# Patient Record
Sex: FEMALE | Race: WHITE | NOT HISPANIC OR LATINO | Employment: STUDENT | URBAN - METROPOLITAN AREA
[De-identification: names, ages, dates, MRNs, and addresses within clinical notes are randomized per-mention and may not be internally consistent; named-entity substitution may affect disease eponyms.]

---

## 2023-01-18 PROBLEM — K52.9 COLITIS: Status: ACTIVE | Noted: 2023-01-18

## 2023-01-18 PROBLEM — U07.1 COVID-19 VIRUS INFECTION: Status: ACTIVE | Noted: 2023-01-18

## 2023-01-18 PROBLEM — F98.8 ADD (ATTENTION DEFICIT DISORDER): Status: ACTIVE | Noted: 2023-01-18

## 2023-01-18 PROBLEM — F51.04 CHRONIC INSOMNIA: Status: ACTIVE | Noted: 2023-01-18

## 2023-01-18 PROBLEM — F32.A ANXIETY AND DEPRESSION: Status: ACTIVE | Noted: 2023-01-18

## 2023-01-18 PROBLEM — M54.2 CERVICAL MUSCLE PAIN: Status: ACTIVE | Noted: 2023-01-18

## 2023-01-18 PROBLEM — E55.9 VITAMIN D DEFICIENCY: Status: ACTIVE | Noted: 2023-01-18

## 2023-01-18 PROBLEM — R79.89 ABNORMAL CBC: Status: ACTIVE | Noted: 2023-01-18

## 2023-01-18 PROBLEM — F60.5 OBSESSIVE COMPULSIVE PERSONALITY DISORDER (MULTI): Status: ACTIVE | Noted: 2023-01-18

## 2023-01-18 PROBLEM — M26.69: Status: ACTIVE | Noted: 2023-01-18

## 2023-01-18 PROBLEM — F41.9 ANXIETY AND DEPRESSION: Status: ACTIVE | Noted: 2023-01-18

## 2023-01-18 PROBLEM — J45.40: Status: ACTIVE | Noted: 2023-01-18

## 2023-01-18 PROBLEM — J35.1 ENLARGED TONSILS: Status: ACTIVE | Noted: 2023-01-18

## 2023-02-15 PROBLEM — Z86.59 HISTORY OF POSTTRAUMATIC STRESS DISORDER (PTSD): Status: ACTIVE | Noted: 2023-02-15

## 2023-02-15 PROBLEM — Z86.59 H/O EATING DISORDER: Status: ACTIVE | Noted: 2023-02-15

## 2023-02-15 PROBLEM — Z97.5 NEXPLANON IN PLACE: Status: ACTIVE | Noted: 2023-02-15

## 2023-03-05 PROBLEM — Z00.00 ROUTINE HEALTH MAINTENANCE: Status: ACTIVE | Noted: 2023-03-05

## 2023-03-05 NOTE — PROGRESS NOTES
Subjective   Patient ID: Aisha Mares is a 23 y.o. female who presents for routine follow on health.    On 11/30/22 we discussed...  Quit smoking so using albuterol less  Effexor for anxiety and depression  Chiro, PT, and flexeril for neck pain  Nutritionist for eating d/o  Hydroxyzine for sleeping d/o  Was to FU with GI for colitis (c-scope and egd)        Review of Systems    Objective   Physical Exam    There were no vitals taken for this visit.    Assessment/Plan   Problem List Items Addressed This Visit    None

## 2023-03-06 ENCOUNTER — APPOINTMENT (OUTPATIENT)
Dept: PRIMARY CARE | Facility: CLINIC | Age: 23
End: 2023-03-06
Payer: COMMERCIAL

## 2023-03-07 ENCOUNTER — APPOINTMENT (OUTPATIENT)
Dept: PRIMARY CARE | Facility: CLINIC | Age: 23
End: 2023-03-07
Payer: COMMERCIAL

## 2023-03-10 ENCOUNTER — OFFICE VISIT (OUTPATIENT)
Dept: PRIMARY CARE | Facility: CLINIC | Age: 23
End: 2023-03-10
Payer: COMMERCIAL

## 2023-03-10 VITALS
DIASTOLIC BLOOD PRESSURE: 76 MMHG | TEMPERATURE: 97.7 F | SYSTOLIC BLOOD PRESSURE: 105 MMHG | HEART RATE: 92 BPM | OXYGEN SATURATION: 96 % | BODY MASS INDEX: 46.97 KG/M2 | WEIGHT: 248.6 LBS

## 2023-03-10 DIAGNOSIS — Z86.59 H/O EATING DISORDER: ICD-10-CM

## 2023-03-10 DIAGNOSIS — F60.5 OBSESSIVE COMPULSIVE PERSONALITY DISORDER (MULTI): ICD-10-CM

## 2023-03-10 DIAGNOSIS — F98.8 ATTENTION DEFICIT DISORDER, UNSPECIFIED HYPERACTIVITY PRESENCE: ICD-10-CM

## 2023-03-10 DIAGNOSIS — M54.2 CERVICAL MUSCLE PAIN: ICD-10-CM

## 2023-03-10 DIAGNOSIS — F41.9 ANXIETY AND DEPRESSION: ICD-10-CM

## 2023-03-10 DIAGNOSIS — J45.40 ASTHMA IN ADULT, MODERATE PERSISTENT, UNCOMPLICATED (HHS-HCC): ICD-10-CM

## 2023-03-10 DIAGNOSIS — F32.A ANXIETY AND DEPRESSION: ICD-10-CM

## 2023-03-10 DIAGNOSIS — G47.33 MODERATE OBSTRUCTIVE SLEEP APNEA: ICD-10-CM

## 2023-03-10 DIAGNOSIS — L70.0 ACNE VULGARIS: Primary | ICD-10-CM

## 2023-03-10 PROBLEM — Z92.89: Status: ACTIVE | Noted: 2023-03-10

## 2023-03-10 PROBLEM — U07.1 COVID-19 VIRUS INFECTION: Status: RESOLVED | Noted: 2023-01-18 | Resolved: 2023-03-10

## 2023-03-10 PROCEDURE — 1036F TOBACCO NON-USER: CPT | Performed by: NURSE PRACTITIONER

## 2023-03-10 PROCEDURE — 99214 OFFICE O/P EST MOD 30 MIN: CPT | Performed by: NURSE PRACTITIONER

## 2023-03-10 RX ORDER — BENZOYL PEROXIDE 5 G/100G
GEL TOPICAL 2 TIMES DAILY
Qty: 60 G | Refills: 11 | Status: SHIPPED | OUTPATIENT
Start: 2023-03-10 | End: 2023-07-08

## 2023-03-10 RX ORDER — METHOCARBAMOL 500 MG/1
500 TABLET, FILM COATED ORAL 3 TIMES DAILY
COMMUNITY
End: 2023-06-06 | Stop reason: ALTCHOICE

## 2023-03-10 RX ORDER — MELOXICAM 15 MG/1
15 TABLET ORAL DAILY
COMMUNITY
End: 2023-06-19 | Stop reason: ALTCHOICE

## 2023-03-10 RX ORDER — TRETINOIN 0.1 MG/G
GEL TOPICAL NIGHTLY
Qty: 45 G | Refills: 11 | Status: SHIPPED | OUTPATIENT
Start: 2023-03-10 | End: 2024-03-09

## 2023-03-10 ASSESSMENT — PATIENT HEALTH QUESTIONNAIRE - PHQ9
SUM OF ALL RESPONSES TO PHQ9 QUESTIONS 1 AND 2: 3
SUM OF ALL RESPONSES TO PHQ QUESTIONS 1-9: 10
2. FEELING DOWN, DEPRESSED OR HOPELESS: SEVERAL DAYS
5. POOR APPETITE OR OVEREATING: MORE THAN HALF THE DAYS
7. TROUBLE CONCENTRATING ON THINGS, SUCH AS READING THE NEWSPAPER OR WATCHING TELEVISION: SEVERAL DAYS
3. TROUBLE FALLING OR STAYING ASLEEP OR SLEEPING TOO MUCH: MORE THAN HALF THE DAYS
10. IF YOU CHECKED OFF ANY PROBLEMS, HOW DIFFICULT HAVE THESE PROBLEMS MADE IT FOR YOU TO DO YOUR WORK, TAKE CARE OF THINGS AT HOME, OR GET ALONG WITH OTHER PEOPLE: VERY DIFFICULT
8. MOVING OR SPEAKING SO SLOWLY THAT OTHER PEOPLE COULD HAVE NOTICED. OR THE OPPOSITE, BEING SO FIGETY OR RESTLESS THAT YOU HAVE BEEN MOVING AROUND A LOT MORE THAN USUAL: NOT AT ALL
4. FEELING TIRED OR HAVING LITTLE ENERGY: MORE THAN HALF THE DAYS
9. THOUGHTS THAT YOU WOULD BE BETTER OFF DEAD, OR OF HURTING YOURSELF: NOT AT ALL
6. FEELING BAD ABOUT YOURSELF - OR THAT YOU ARE A FAILURE OR HAVE LET YOURSELF OR YOUR FAMILY DOWN: NOT AT ALL
1. LITTLE INTEREST OR PLEASURE IN DOING THINGS: MORE THAN HALF THE DAYS

## 2023-03-10 NOTE — PROGRESS NOTES
Subjective   Patient ID: Aisha Mares is a 23 y.o. female who presents for Follow-up (3-4 month follow up/Mental health medication ).  Sleep apnea - has cpap has not started to use, had to wait for new mask  Plans to start this weekend    Thinks effexor is still working  Had more depressive symptoms recently, but seeing new counselor  1 month until her 1 yr sobriety   Misses college lifestyle    Working out at gym  But more neck and back pain recently  Saw doc at spine clinic  Is going to start PT  Does take robaxin, but has not used recently    Did not see nutritionist  Angelic Stark in [place] is eating disorder counselor    Review of Systems  ROS completely negative except what was mentioned in the HPI.  Problem List, surgical, social, and family histories which were reviewed and updated as necessary within the EMR. I also personally reviewed the notes, labs, and imaging that pertained to what was documented or discussed in the HPI.      Objective   Physical Exam  Vitals and nursing note reviewed.   Constitutional:       General: She is not in acute distress.     Appearance: Normal appearance.   HENT:      Head: Normocephalic and atraumatic.      Right Ear: Tympanic membrane, ear canal and external ear normal.      Left Ear: Tympanic membrane, ear canal and external ear normal.      Nose: Nose normal.      Mouth/Throat:      Mouth: Mucous membranes are moist.   Eyes:      Extraocular Movements: Extraocular movements intact.      Conjunctiva/sclera: Conjunctivae normal.      Pupils: Pupils are equal, round, and reactive to light.   Cardiovascular:      Rate and Rhythm: Normal rate and regular rhythm.      Heart sounds: Normal heart sounds.   Pulmonary:      Effort: Pulmonary effort is normal.      Breath sounds: Normal breath sounds.   Musculoskeletal:         General: Normal range of motion.      Cervical back: Normal range of motion and neck supple.   Lymphadenopathy:      Cervical: No cervical adenopathy.    Skin:     General: Skin is warm and dry.   Neurological:      Mental Status: She is alert and oriented to person, place, and time. Mental status is at baseline.   Psychiatric:         Mood and Affect: Mood normal.         Behavior: Behavior normal.         Thought Content: Thought content normal.         Judgment: Judgment normal.         /76 (BP Location: Right leg)   Pulse 92   Temp 36.5 °C (97.7 °F) (Temporal)   Wt 113 kg (248 lb 9.6 oz)   SpO2 96%   BMI 46.97 kg/m²     Assessment/Plan   Problem List Items Addressed This Visit          Nervous    Moderate obstructive sleep apnea     Has plan to start CPAP this weekend            Respiratory    Asthma in adult, moderate persistent, uncomplicated     Has improved since quitting vaping            Musculoskeletal    Cervical muscle pain     Has upcoming PT  Robaxin prn            Other    ADD (attention deficit disorder)    Anxiety and depression    Obsessive compulsive personality disorder (CMS/HCC)    H/O eating disorder     Eating disorder counselor - Angelic Stark in [place]         Acne vulgaris - Primary     Ordered benzoyl peroxide and retin-a  Derm referral         Relevant Medications    benzoyl peroxide 5 % gel    tretinoin (Retin-A) 0.01 % gel    Other Relevant Orders    Referral to Dermatology

## 2023-05-27 PROBLEM — Z87.820 H/O CONCUSSION: Status: ACTIVE | Noted: 2023-05-27

## 2023-05-27 PROBLEM — E66.813 CLASS 3 SEVERE OBESITY DUE TO EXCESS CALORIES WITH BODY MASS INDEX (BMI) OF 45.0 TO 49.9 IN ADULT: Status: ACTIVE | Noted: 2023-05-27

## 2023-05-27 PROBLEM — E66.01 CLASS 3 SEVERE OBESITY DUE TO EXCESS CALORIES WITH BODY MASS INDEX (BMI) OF 45.0 TO 49.9 IN ADULT (MULTI): Status: ACTIVE | Noted: 2023-05-27

## 2023-05-27 PROBLEM — R79.89 ABNORMAL CBC: Status: RESOLVED | Noted: 2023-01-18 | Resolved: 2023-05-27

## 2023-05-27 PROBLEM — Z79.899 MEDICATION MANAGEMENT: Status: ACTIVE | Noted: 2023-05-27

## 2023-06-05 ENCOUNTER — TELEPHONE (OUTPATIENT)
Dept: PRIMARY CARE | Facility: CLINIC | Age: 23
End: 2023-06-05

## 2023-06-05 ENCOUNTER — APPOINTMENT (OUTPATIENT)
Dept: PRIMARY CARE | Facility: CLINIC | Age: 23
End: 2023-06-05
Payer: COMMERCIAL

## 2023-06-05 NOTE — TELEPHONE ENCOUNTER
Pt called and left message regarding pain in shoulder and neck and now numbness in left hand  Spoke with Pt  Pt stated she has undergone 6 weeks of PT -3 times a week. It has helped her back issue but has not helped her neck or shoulder- actually at times it made it worse  Her left hand has been numb since yesterday  Pt stated that when her head is still her pain is a 2. When her head is tilted to the right it is a 7.  When her head is tilted to the left she states the pain is a 9 and she gets shooting pain down her left side to her hand, side and foot  Please advise

## 2023-06-06 ENCOUNTER — OFFICE VISIT (OUTPATIENT)
Dept: PRIMARY CARE | Facility: CLINIC | Age: 23
End: 2023-06-06
Payer: COMMERCIAL

## 2023-06-06 VITALS
DIASTOLIC BLOOD PRESSURE: 75 MMHG | BODY MASS INDEX: 48 KG/M2 | TEMPERATURE: 98.1 F | HEART RATE: 110 BPM | HEIGHT: 61 IN | WEIGHT: 254.25 LBS | OXYGEN SATURATION: 96 % | SYSTOLIC BLOOD PRESSURE: 108 MMHG

## 2023-06-06 DIAGNOSIS — E55.9 VITAMIN D DEFICIENCY: ICD-10-CM

## 2023-06-06 DIAGNOSIS — M54.12 CERVICAL RADICULOPATHY: ICD-10-CM

## 2023-06-06 DIAGNOSIS — F41.9 ANXIETY AND DEPRESSION: ICD-10-CM

## 2023-06-06 DIAGNOSIS — F32.A ANXIETY AND DEPRESSION: ICD-10-CM

## 2023-06-06 DIAGNOSIS — M54.2 NECK PAIN: Primary | ICD-10-CM

## 2023-06-06 DIAGNOSIS — R73.9 HYPERGLYCEMIA: ICD-10-CM

## 2023-06-06 PROCEDURE — 99214 OFFICE O/P EST MOD 30 MIN: CPT | Performed by: INTERNAL MEDICINE

## 2023-06-06 PROCEDURE — 1036F TOBACCO NON-USER: CPT | Performed by: INTERNAL MEDICINE

## 2023-06-06 RX ORDER — GABAPENTIN 100 MG/1
100 CAPSULE ORAL 3 TIMES DAILY
Qty: 90 CAPSULE | Refills: 5 | Status: SHIPPED | OUTPATIENT
Start: 2023-06-06 | End: 2023-06-19 | Stop reason: SDUPTHER

## 2023-06-06 RX ORDER — METHYLPREDNISOLONE 4 MG/1
TABLET ORAL
Qty: 21 TABLET | Refills: 0 | Status: SHIPPED | OUTPATIENT
Start: 2023-06-06 | End: 2023-06-13

## 2023-06-06 RX ORDER — LAMOTRIGINE 25 MG/1
50 TABLET ORAL DAILY
COMMUNITY
End: 2023-09-26 | Stop reason: DRUGHIGH

## 2023-06-06 ASSESSMENT — PATIENT HEALTH QUESTIONNAIRE - PHQ9
2. FEELING DOWN, DEPRESSED OR HOPELESS: NOT AT ALL
1. LITTLE INTEREST OR PLEASURE IN DOING THINGS: NOT AT ALL
SUM OF ALL RESPONSES TO PHQ9 QUESTIONS 1 AND 2: 0

## 2023-06-06 NOTE — PROGRESS NOTES
Assessment and Plan:  Problem List Items Addressed This Visit    None  Visit Diagnoses       Neck pain    -  Primary    Relevant Medications    methylPREDNISolone (Medrol Dospak) 4 mg tablets    gabapentin (Neurontin) 100 mg capsule    Other Relevant Orders    MR cervical spine wo IV contrast    Referral to Medical Spine    Cervical radiculopathy        XR normal in 11/22  Sx present for 3 years  Failed PT and NSAIDs  Will get MRI  Medrol and Gabepentin for pain    Relevant Medications    methylPREDNISolone (Medrol Dospak) 4 mg tablets    gabapentin (Neurontin) 100 mg capsule    Other Relevant Orders    MR cervical spine wo IV contrast    Referral to Medical Spine            HPI: c/o neck pain  Symptoms started NYD 2020. Woke up and could barely move her neck  Thought she slept wrong but persisted  Worse over time  Hears cracking if turns head to the right  Even just doing chores has to lay down due to the pain  Points to base of neck and border of shoulder, sometimes hairline and sometimes down back  Also had lower back issues and was seeing PT  Went to [facility]  Did PT for 6 weeks- 3 times a week.   It helped her lower back but made pain worse in shoulder and neck.   Even touching the back of the neck on the cervical spine she felt so much pain she thought she would fall over  Then got the shooting pain and numbness down the top and ventral aspect of her arm, she points  Middle finger and thumb mostly  No pain in legs now, did have some pain with her back pain  Sometimes if PT touched places on her neck seh would get pain down her legs  Bad HA past two days  + nausea, maybe due to the pain  HA base of skull  PT said it could be joint related.   Sunday night pain at base of skull; felt like she could collapse; some shooting pain but numbness in left hand has been consistent since Sunday night.   Muscle relaxant helps her sleep  Mobic not helping pain  XR neck 11/22:  Normal radiographs of the cervical spine  XR  also in 2014: mild straightening of the cervical spine which is likely positional     Labs reviewed:  Component      Latest Ref Rng 11/16/2022   WBC      4.4 - 11.3 x10E9/L 7.5    RBC      4.00 - 5.20 x10E12/L 5.62 (H)    HEMOGLOBIN      12.0 - 16.0 g/dL 16.1 (H)    HEMATOCRIT      36.0 - 46.0 % 48.9 (H)    MCV      80 - 100 fL 87    MCHC      32.0 - 36.0 g/dL 32.9    Platelets      150 - 450 x10E9/L 286    RED CELL DISTRIBUTION WIDTH      11.5 - 14.5 % 12.7    Neutrophils %      40.0 - 80.0 % 44.7    Immature Granulocytes %, Automated      0.0 - 0.9 % 0.3    Lymphocytes %      13.0 - 44.0 % 30.7    Monocytes %      2.0 - 10.0 % 8.9    Eosinophils %      0.0 - 6.0 % 14.6    Basophils %      0.0 - 2.0 % 0.8    Neutrophils Absolute      1.20 - 7.70 x10E9/L 3.35    Lymphocytes Absolute      1.20 - 4.80 x10E9/L 2.30    Monocytes Absolute      0.10 - 1.00 x10E9/L 0.67    Eosinophils Absolute      0.00 - 0.70 x10E9/L 1.09 (H)    Basophils Absolute      0.00 - 0.10 x10E9/L 0.06    GLUCOSE      74 - 99 mg/dL 78    SODIUM      136 - 145 mmol/L 139    POTASSIUM      3.5 - 5.3 mmol/L 3.8    CHLORIDE      98 - 107 mmol/L 102    Bicarbonate      21 - 32 mmol/L 27    Anion Gap      10 - 20 mmol/L 14    Blood Urea Nitrogen      6 - 23 mg/dL 12    Creatinine      0.50 - 1.05 mg/dL 1.07 (H)    GFR Female      >90 mL/min/1.73m2 75    Calcium      8.6 - 10.3 mg/dL 9.4    Albumin      3.4 - 5.0 g/dL 4.0    Alkaline Phosphatase      33 - 110 U/L 78    Total Protein      6.4 - 8.2 g/dL 7.6    AST      9 - 39 U/L 23    Bilirubin Total      0.0 - 1.2 mg/dL 0.6    ALT      7 - 45 U/L 31    Color, Urine      STRAW,YELLOW  YELLOW    Appearance, Urine      CLEAR  HAZY    Specific Gravity, Urine      1.005 - 1.035  1.024    pH, Urine      5.0 - 8.0  6.0    Protein, Urine      NEGATIVE mg/dL NEGATIVE    Glucose, Urine      NEGATIVE mg/dL NEGATIVE    Blood, Urine      NEGATIVE  NEGATIVE    Ketones, Urine      NEGATIVE mg/dL NEGATIVE    Bilirubin,  "Urine      NEGATIVE  NEGATIVE    Urobilinogen, Urine      0.0 - 1.9 mg/dL <2.0    Nitrite, Urine      NEGATIVE  NEGATIVE    Leukocyte Esterase, Urine      NEGATIVE  NEGATIVE    CHOLESTEROL      0 - 199 mg/dL 134    HDL CHOLESTEROL      mg/dL 58.0    Cholesterol/HDL Ratio 2.3    LDL      0 - 119 mg/dL 61    VLDL      0 - 40 mg/dL 15    TRIGLYCERIDES      0 - 149 mg/dL 74    Non HDL Cholesterol      0 - 149 mg/dL 76    Triiodothyronine, Free      2.3 - 4.2 pg/mL 3.5    Vitamin D, 25-Hydroxy, Total      ng/mL 23 !    Thyroxine, Free      0.61 - 1.12 ng/dL 0.68    Thyroid Stimulating Hormone      0.44 - 3.98 mIU/L 5.08 (H)      Component      Latest Ref [facility] 11/28/2022   WBC      4.4 - 11.3 x10E9/L 7.2    RBC      4.00 - 5.20 x10E12/L 5.50 (H)    HEMOGLOBIN      12.0 - 16.0 g/dL 15.7    HEMATOCRIT      36.0 - 46.0 % 48.1 (H)    MCV      80 - 100 fL 87    MCHC      32.0 - 36.0 g/dL 32.6    Platelets      150 - 450 x10E9/L 308    RED CELL DISTRIBUTION WIDTH      11.5 - 14.5 % 12.9    Neutrophils %      40.0 - 80.0 % 43.8    Immature Granulocytes %, Automated      0.0 - 0.9 % 0.1    Lymphocytes %      13.0 - 44.0 % 32.2    Monocytes %      2.0 - 10.0 % 10.1    Eosinophils %      0.0 - 6.0 % 12.8    Basophils %      0.0 - 2.0 % 1.0    Neutrophils Absolute      1.20 - 7.70 x10E9/L 3.17    Lymphocytes Absolute      1.20 - 4.80 x10E9/L 2.33    Monocytes Absolute      0.10 - 1.00 x10E9/L 0.73    Eosinophils Absolute      0.00 - 0.70 x10E9/L 0.93 (H)    Basophils Absolute      0.00 - 0.10 x10E9/L 0.07         ROS otherwise negative aside from what was mentioned above in HPI.    Vitals  /75   Pulse 110   Temp 36.7 °C (98.1 °F)   Ht 1.549 m (5' 1\")   Wt 115 kg (254 lb 4 oz)   SpO2 96%   BMI 48.04 kg/m²   Body mass index is 48.04 kg/m².  Physical Exam  Gen: Alert, NAD  HEENT: Normal  Nexk: Tender ovver the Trap muscle on the left, also over the cervical spinous processes and paraspinal muscles  Decreased ROM due to " pain  Ext: Pain with ROM testing left shoulder  Respiratory:  Lungs CTAB  CV: RRR  Neuro:  Decreased sensation to all modalities on the ;eft arm and leg, decreased strength left leg (5-/5)  Reflexes throughout      Active Problem List  Patient Active Problem List   Diagnosis    ADD (attention deficit disorder)    Anxiety and depression    Asthma in adult, moderate persistent, uncomplicated    Chronic insomnia    Obsessive compulsive personality disorder (CMS/HCC)    TMJ hypermobility    Vitamin D deficiency    Colitis    Enlarged tonsils    H/O eating disorder    History of posttraumatic stress disorder (PTSD)    Nexplanon in place    Routine health maintenance    Moderate obstructive sleep apnea    Acne vulgaris    H/O CT scan of brain    Medication management    Class 3 severe obesity due to excess calories with body mass index (BMI) of 45.0 to 49.9 in adult (CMS/HCC)    H/O concussion       Comprehensive Medical/Surgical/Social/Family History  Past Medical History:   Diagnosis Date    H/O CT scan of abdomen 2022 11/19/22 - Findings suggestive of epiploic appendagitis along the mid descending colon. Borderline prominence of the appendix, without surrounding fat stranding. Clinical correlation needed. Redemonstration of mildly asymmetrically enlarged left adnexa. Consider correlation with pelvic ultrasound if clinically indicated.    H/O CT scan of brain 2014 2014: NORMAL BRAIN.  NO EVIDENCE OF AN ACUTE INTRACRANIAL PROCESS.    H/O pulmonary function tests     History of cervical spine x-ray 11/2022 2014: mild straightening of the cervical spine which is likely  positional 11/22: normal    History of magnetic resonance imaging of head 07/2022 7/22:  MRI TMJ: 1. Negative study without evidence of disease involving bilateral temporomandibular joints.     Past Surgical History:   Procedure Laterality Date    INSERTION OF CONTRACEPTIVE CAPSULE  08/22/2021    Nexplanon insertion: 8/17/21    NASAL SEPTUM  SURGERY  2021    RHINOPLASTY  2021     Social History     Social History Narrative    Single    no kids    Has boyfriend    Works at [employer] , cheer     Nonsmoker    Social  ETOH    ---    Family History:    M:Mental Health Issues (Bryanna Mares)    F: Mental Health Issues, ETOHism    S:    PUncle; DM    MGM: Lymphoma, CAD    PGM: Lymphoma, Lung Issues ()       Allergies and Medications  Apple, Dog dander, Nut - unspecified, and Sesame seed  Current Outpatient Medications   Medication Instructions    albuterol 90 mcg/actuation inhaler 1-2 puffs, inhalation, PO EVERY 4-6 HOURS    benzoyl peroxide 5 % gel Topical, 2 times daily, apply to affected area    budesonide-formoteroL (Symbicort) 160-4.5 mcg/actuation inhaler 2 puffs, inhalation, 2 times daily    cholecalciferol (VITAMIN D-3) 50 mcg, oral, Daily, TAKE WITH FOOD    cyclobenzaprine (FLEXERIL) 5 mg, oral, 1-2 TAB DAILY 3 TIMES A DAY    EPINEPHrine 0.3 mg/0.3 mL injection syringe 0.3 mL, injection, As needed    etonogestrel-eluting contraceptive (Nexplanon) 68 mg implant implant subcutaneous    famotidine (PEPCID) 20 mg, oral, As needed    gabapentin (NEURONTIN) 100 mg, oral, 3 times daily    lamoTRIgine (LAMICTAL) 50 mg, oral, Daily    meloxicam (MOBIC) 15 mg, oral, Daily, as needed    methylPREDNISolone (Medrol Dospak) 4 mg tablets Take as directed on package.    omeprazole (PRILOSEC) 20 mg, oral, Daily, Do not crush or chew.     traZODone (Desyrel) 100 mg tablet 1 tablet, oral, Nightly    tretinoin (Retin-A) 0.01 % gel Topical, Nightly    venlafaxine XR (EFFEXOR-XR) 150 mg, oral, Daily, WITH FOOD @ 9AM WITH 75 MG CAP FOR DAILY DOSE 225 MG    venlafaxine XR (EFFEXOR-XR) 75 mg, oral, Daily, WITH FOOD @ 9AM TAKE WITH 150 MG FOR TOTAL DAILY DOSE  MG

## 2023-06-16 PROBLEM — M48.02 CERVICAL SPINAL STENOSIS: Status: ACTIVE | Noted: 2023-06-16

## 2023-06-19 ENCOUNTER — LAB (OUTPATIENT)
Dept: LAB | Facility: LAB | Age: 23
End: 2023-06-19
Payer: COMMERCIAL

## 2023-06-19 ENCOUNTER — OFFICE VISIT (OUTPATIENT)
Dept: PRIMARY CARE | Facility: CLINIC | Age: 23
End: 2023-06-19
Payer: COMMERCIAL

## 2023-06-19 VITALS
OXYGEN SATURATION: 99 % | TEMPERATURE: 98.6 F | WEIGHT: 259.13 LBS | DIASTOLIC BLOOD PRESSURE: 70 MMHG | HEART RATE: 112 BPM | SYSTOLIC BLOOD PRESSURE: 118 MMHG | HEIGHT: 62 IN | BODY MASS INDEX: 47.69 KG/M2

## 2023-06-19 DIAGNOSIS — Z11.8 SCREENING FOR CHLAMYDIAL DISEASE: ICD-10-CM

## 2023-06-19 DIAGNOSIS — M54.12 CERVICAL RADICULOPATHY: ICD-10-CM

## 2023-06-19 DIAGNOSIS — E55.9 VITAMIN D DEFICIENCY: ICD-10-CM

## 2023-06-19 DIAGNOSIS — R73.9 HYPERGLYCEMIA: ICD-10-CM

## 2023-06-19 DIAGNOSIS — E66.01 CLASS 3 SEVERE OBESITY DUE TO EXCESS CALORIES WITH SERIOUS COMORBIDITY AND BODY MASS INDEX (BMI) OF 45.0 TO 49.9 IN ADULT (MULTI): ICD-10-CM

## 2023-06-19 DIAGNOSIS — Z00.00 ROUTINE HEALTH MAINTENANCE: Primary | ICD-10-CM

## 2023-06-19 DIAGNOSIS — F32.A ANXIETY AND DEPRESSION: ICD-10-CM

## 2023-06-19 DIAGNOSIS — M54.2 NECK PAIN: ICD-10-CM

## 2023-06-19 DIAGNOSIS — M48.02 CERVICAL SPINAL STENOSIS: ICD-10-CM

## 2023-06-19 DIAGNOSIS — Z00.00 ROUTINE HEALTH MAINTENANCE: ICD-10-CM

## 2023-06-19 DIAGNOSIS — F41.9 ANXIETY AND DEPRESSION: ICD-10-CM

## 2023-06-19 DIAGNOSIS — Z86.59 H/O EATING DISORDER: ICD-10-CM

## 2023-06-19 PROBLEM — M50.90 CERVICAL DISC DISEASE: Status: ACTIVE | Noted: 2023-06-19

## 2023-06-19 LAB
ALANINE AMINOTRANSFERASE (SGPT) (U/L) IN SER/PLAS: 24 U/L (ref 7–45)
ALBUMIN (G/DL) IN SER/PLAS: 4.1 G/DL (ref 3.4–5)
ALKALINE PHOSPHATASE (U/L) IN SER/PLAS: 77 U/L (ref 33–110)
ANION GAP IN SER/PLAS: 10 MMOL/L (ref 10–20)
ASPARTATE AMINOTRANSFERASE (SGOT) (U/L) IN SER/PLAS: 16 U/L (ref 9–39)
BASOPHILS (10*3/UL) IN BLOOD BY AUTOMATED COUNT: 0.07 X10E9/L (ref 0–0.1)
BASOPHILS/100 LEUKOCYTES IN BLOOD BY AUTOMATED COUNT: 0.7 % (ref 0–2)
BILIRUBIN TOTAL (MG/DL) IN SER/PLAS: 0.4 MG/DL (ref 0–1.2)
CALCIDIOL (25 OH VITAMIN D3) (NG/ML) IN SER/PLAS: 21 NG/ML
CALCIUM (MG/DL) IN SER/PLAS: 9.3 MG/DL (ref 8.6–10.3)
CARBON DIOXIDE, TOTAL (MMOL/L) IN SER/PLAS: 28 MMOL/L (ref 21–32)
CHLORIDE (MMOL/L) IN SER/PLAS: 105 MMOL/L (ref 98–107)
CREATININE (MG/DL) IN SER/PLAS: 0.93 MG/DL (ref 0.5–1.05)
EOSINOPHILS (10*3/UL) IN BLOOD BY AUTOMATED COUNT: 0.78 X10E9/L (ref 0–0.7)
EOSINOPHILS/100 LEUKOCYTES IN BLOOD BY AUTOMATED COUNT: 8.2 % (ref 0–6)
ERYTHROCYTE DISTRIBUTION WIDTH (RATIO) BY AUTOMATED COUNT: 12.8 % (ref 11.5–14.5)
ERYTHROCYTE MEAN CORPUSCULAR HEMOGLOBIN CONCENTRATION (G/DL) BY AUTOMATED: 33.3 G/DL (ref 32–36)
ERYTHROCYTE MEAN CORPUSCULAR VOLUME (FL) BY AUTOMATED COUNT: 87 FL (ref 80–100)
ERYTHROCYTES (10*6/UL) IN BLOOD BY AUTOMATED COUNT: 4.93 X10E12/L (ref 4–5.2)
GFR FEMALE: 88 ML/MIN/1.73M2
GLUCOSE (MG/DL) IN SER/PLAS: 74 MG/DL (ref 74–99)
HEMATOCRIT (%) IN BLOOD BY AUTOMATED COUNT: 42.9 % (ref 36–46)
HEMOGLOBIN (G/DL) IN BLOOD: 14.3 G/DL (ref 12–16)
IMMATURE GRANULOCYTES/100 LEUKOCYTES IN BLOOD BY AUTOMATED COUNT: 0.4 % (ref 0–0.9)
LEUKOCYTES (10*3/UL) IN BLOOD BY AUTOMATED COUNT: 9.6 X10E9/L (ref 4.4–11.3)
LYMPHOCYTES (10*3/UL) IN BLOOD BY AUTOMATED COUNT: 2.36 X10E9/L (ref 1.2–4.8)
LYMPHOCYTES/100 LEUKOCYTES IN BLOOD BY AUTOMATED COUNT: 24.7 % (ref 13–44)
MONOCYTES (10*3/UL) IN BLOOD BY AUTOMATED COUNT: 0.81 X10E9/L (ref 0.1–1)
MONOCYTES/100 LEUKOCYTES IN BLOOD BY AUTOMATED COUNT: 8.5 % (ref 2–10)
NEUTROPHILS (10*3/UL) IN BLOOD BY AUTOMATED COUNT: 5.5 X10E9/L (ref 1.2–7.7)
NEUTROPHILS/100 LEUKOCYTES IN BLOOD BY AUTOMATED COUNT: 57.5 % (ref 40–80)
PLATELETS (10*3/UL) IN BLOOD AUTOMATED COUNT: 267 X10E9/L (ref 150–450)
POTASSIUM (MMOL/L) IN SER/PLAS: 4.4 MMOL/L (ref 3.5–5.3)
PROTEIN TOTAL: 6.7 G/DL (ref 6.4–8.2)
SODIUM (MMOL/L) IN SER/PLAS: 139 MMOL/L (ref 136–145)
THYROTROPIN (MIU/L) IN SER/PLAS BY DETECTION LIMIT <= 0.05 MIU/L: 1.68 MIU/L (ref 0.44–3.98)
UREA NITROGEN (MG/DL) IN SER/PLAS: 9 MG/DL (ref 6–23)

## 2023-06-19 PROCEDURE — 85025 COMPLETE CBC W/AUTO DIFF WBC: CPT

## 2023-06-19 PROCEDURE — 83036 HEMOGLOBIN GLYCOSYLATED A1C: CPT

## 2023-06-19 PROCEDURE — 84443 ASSAY THYROID STIM HORMONE: CPT

## 2023-06-19 PROCEDURE — 36415 COLL VENOUS BLD VENIPUNCTURE: CPT

## 2023-06-19 PROCEDURE — 87591 N.GONORRHOEAE DNA AMP PROB: CPT

## 2023-06-19 PROCEDURE — 99395 PREV VISIT EST AGE 18-39: CPT | Performed by: INTERNAL MEDICINE

## 2023-06-19 PROCEDURE — 1036F TOBACCO NON-USER: CPT | Performed by: INTERNAL MEDICINE

## 2023-06-19 PROCEDURE — 82306 VITAMIN D 25 HYDROXY: CPT

## 2023-06-19 PROCEDURE — 80053 COMPREHEN METABOLIC PANEL: CPT

## 2023-06-19 PROCEDURE — 87491 CHLMYD TRACH DNA AMP PROBE: CPT

## 2023-06-19 PROCEDURE — 3008F BODY MASS INDEX DOCD: CPT | Performed by: INTERNAL MEDICINE

## 2023-06-19 RX ORDER — LACTOBACILLUS COMBINATION NO.4 3B CELL
1 CAPSULE ORAL
COMMUNITY
Start: 2022-11-19

## 2023-06-19 RX ORDER — ACETAMINOPHEN AND CODEINE PHOSPHATE 300; 30 MG/1; MG/1
1 TABLET ORAL EVERY 4 HOURS PRN
Qty: 42 TABLET | Refills: 0 | Status: SHIPPED | OUTPATIENT
Start: 2023-06-19 | End: 2023-06-26

## 2023-06-19 RX ORDER — ONDANSETRON 4 MG/1
4 TABLET, ORALLY DISINTEGRATING ORAL EVERY 6 HOURS PRN
COMMUNITY
Start: 2022-11-12

## 2023-06-19 RX ORDER — GABAPENTIN 300 MG/1
300 CAPSULE ORAL 3 TIMES DAILY
Qty: 90 CAPSULE | Refills: 5 | Status: SHIPPED | OUTPATIENT
Start: 2023-06-19 | End: 2023-09-26 | Stop reason: SDUPTHER

## 2023-06-19 RX ORDER — MONTELUKAST SODIUM 10 MG/1
10 TABLET ORAL NIGHTLY
COMMUNITY
Start: 2022-11-30 | End: 2023-09-26 | Stop reason: WASHOUT

## 2023-06-19 RX ORDER — SEMAGLUTIDE 0.25 MG/.5ML
0.25 INJECTION, SOLUTION SUBCUTANEOUS
Qty: 2 ML | Refills: 0 | Status: SHIPPED | OUTPATIENT
Start: 2023-06-19 | End: 2023-09-26 | Stop reason: WASHOUT

## 2023-06-19 RX ORDER — HYDROXYZINE HYDROCHLORIDE 25 MG/1
25 TABLET, FILM COATED ORAL AS NEEDED
COMMUNITY

## 2023-06-19 ASSESSMENT — PATIENT HEALTH QUESTIONNAIRE - PHQ9
SUM OF ALL RESPONSES TO PHQ9 QUESTIONS 1 AND 2: 0
1. LITTLE INTEREST OR PLEASURE IN DOING THINGS: NOT AT ALL
2. FEELING DOWN, DEPRESSED OR HOPELESS: NOT AT ALL

## 2023-06-19 NOTE — PROGRESS NOTES
Reason for Visit: Annual Physical Exam    Assessment and Plan:  Problem List Items Addressed This Visit          High    Routine health maintenance - Primary    Overview     COVID-19 vaccine (PFIZER-BIONTECH)4/22/2021, 3/26/2021, 1/15/22  Influenza 9/23/21  TDAP 7/14/22  DTAP/IPV5/24/2004  MMR5/24/2004  Meningococcal Conjugate MCV4P Vaccine, IM3/26/2015  PAP 8/17/21 (-)  GC/Chlamydia 8/17/21 (-)  Hep C Ab (-) 9/11/18  HIV 9/11/18 (-)  Nexplanon in place         Current Assessment & Plan     Annual Wellness exam completed   Preventive Health history reviewed:  Vaccines today: none  Labs ordered    Depression Screening done         Relevant Orders    Comprehensive metabolic panel    CBC and Auto Differential       Medium    Cervical spinal stenosis    Overview     6/23: MRI Cervical spine: Mild spinal canal stenosis at C5-C6 with minimal flattening of the  left ventral spinal cord without spinal cord signal abnormality and  with associated mild left neural foraminal stenosis         Current Assessment & Plan     Will increase Gabapentin to max dose if tolerated at HS and 300mg TID, QID during day  Spine appt ASAP         Relevant Medications    acetaminophen-codeine (Tylenol w/ Codeine #3) 300-30 mg tablet    Class 3 severe obesity due to excess calories with body mass index (BMI) of 45.0 to 49.9 in adult (CMS/[facility])    Overview     No Family hx MEN/MTC  NO hx pancreatitis         Current Assessment & Plan     Will try GLP1 med  I have discussed both the risks and benefits of the treatment plan, including potential adverse effects/benefits of medication as well as not taking medication (and the potential risks of that), and patient understands/agrees with the treatment plan.           Relevant Medications    semaglutide, weight loss, (Wegovy) 0.25 mg/0.5 mL pen injector    H/O eating disorder    Overview     Anorexia, mild bulimia  Was Enrolled in the [employer] and put on Wellbutrin  prn Pepcid  s/p Admission to the  "[facility] 3/22 (excellent per patient)  Angelic Stark 2x a week         Relevant Orders    XR DEXA bone density     Other Visit Diagnoses       Neck pain        Relevant Medications    gabapentin (Neurontin) 300 mg capsule    Cervical radiculopathy        XR normal in 11/22  Sx present for 3 years  Failed PT and NSAIDs  Will get MRI  Medrol and Gabepentin for pain    Relevant Medications    gabapentin (Neurontin) 300 mg capsule    acetaminophen-codeine (Tylenol w/ Codeine #3) 300-30 mg tablet    Screening for chlamydial disease        Relevant Orders    C. Trachomatis / N. Gonorrhoeae, Amplified Detection              HPI:Sx have been for 3 years and numbness and excruciating pain started 2 weeks ago  Worse than at appt  on 6/6/23  Not sleeping due to the pain  Can't turn head to the left  Results Of MRI reviewed:  C5-C6: There is a focal left paracentral disc protrusion with  resultant mild spinal canal stenosis and with flattening of the left  ventral spinal cord without spinal cord signal abnormality and mild  narrowing of the left neural foramen. Tiny right paracentral  protrusion results in mild right neural foraminal stenosis.    PT not helpful, traction caused nausea  Medrol dosepak didn't help  Gabapentin helped   300mg    Weight loss medicine discussion  Due to inactivity she has gained weight  Has good insurance    ROS otherwise negative aside from what was mentioned above in HPI.    Vitals  /70   Pulse (!) 112   Temp 37 °C (98.6 °F)   Ht 1.562 m (5' 1.5\")   Wt 118 kg (259 lb 2 oz)   SpO2 99%   BMI 48.17 kg/m²   Body mass index is 48.17 kg/m².  Physical Exam  Gen: Alert, NAD  HEENT:  Normal exam  Neck:  No masses/nodes palpable; Thyroid nontender and without nodules; No ADOLFO  Respiratory:  Lungs CTAB  CV: RRR  Neuro:  Gross motor and sensory intact  Skin:  No suspicious lesions present  Breast: No masses or axillary lymphadenopathy  Gyn: Normal pelvic exam: no uterine " masses or cervical lesions, or CMT; no vaginal D/C. No ovarian or adnexal masses; No external vaginal or anal/perineal lesions (Pt declined chaperone)    Active Problem List  Patient Active Problem List   Diagnosis    ADD (attention deficit disorder)    Anxiety and depression    Asthma in adult, moderate persistent, uncomplicated    Chronic insomnia    Obsessive compulsive personality disorder (CMS/HCC)    TMJ hypermobility    Vitamin D deficiency    Colitis    Enlarged tonsils    H/O eating disorder    History of posttraumatic stress disorder (PTSD)    Nexplanon in place    Routine health maintenance    Moderate obstructive sleep apnea    Acne vulgaris    H/O CT scan of brain    Medication management    Class 3 severe obesity due to excess calories with body mass index (BMI) of 45.0 to 49.9 in adult (CMS/HCC)    H/O concussion    Cervical spinal stenosis    Cervical disc disease       Comprehensive Medical/Surgical/Social/Family History  Past Medical History:   Diagnosis Date    H/O CT scan of abdomen 2022 11/19/22 - Findings suggestive of epiploic appendagitis along the mid descending colon. Borderline prominence of the appendix, without surrounding fat stranding. Clinical correlation needed. Redemonstration of mildly asymmetrically enlarged left adnexa. Consider correlation with pelvic ultrasound if clinically indicated.    H/O CT scan of brain 2014 2014: NORMAL BRAIN.  NO EVIDENCE OF AN ACUTE INTRACRANIAL PROCESS.    H/O magnetic resonance imaging of cervical spine 06/16/2023    Mild spinal canal stenosis at C5-C6 with minimal flattening of the left ventral spinal cord without spinal cord signal abnormality and with associated mild left neural foraminal stenosis    H/O pulmonary function tests     History of cervical spine x-ray 11/2022    2014: mild straightening of the cervical spine which is likely  positional 11/22: normal    History of magnetic resonance imaging of head 07/2022 7/22:  MRI TMJ: 1.  Negative study without evidence of disease involving bilateral temporomandibular joints.     Past Surgical History:   Procedure Laterality Date    INSERTION OF CONTRACEPTIVE CAPSULE  2021    Nexplanon insertion: 21    NASAL SEPTUM SURGERY  2021    RHINOPLASTY  2021     Social History     Social History Narrative    Single    no kids    Has boyfriend    Works at [email or web address] , cheer     Nonsmoker    No ETOH    ---    Family History:    M:Mental Health Issues (Bryanna Mares)    F: Mental Health Issues, ETOHism    S:    PUncle; DM    MGM: Lymphoma, CAD    PGM: Lymphoma, Lung Issues ()       Allergies and Medications  Apple, Chickpea, Dog dander, Pine nut, and Sesame seed  Current Outpatient Medications   Medication Instructions    acetaminophen-codeine (Tylenol w/ Codeine #3) 300-30 mg tablet 1 tablet, oral, Every 4 hours PRN    albuterol 90 mcg/actuation inhaler 1-2 puffs, inhalation, PO EVERY 4-6 HOURS    benzoyl peroxide 5 % gel Topical, 2 times daily, apply to affected area    budesonide-formoteroL (Symbicort) 160-4.5 mcg/actuation inhaler 2 puffs, inhalation, 2 times daily    cholecalciferol (VITAMIN D-3) 50 mcg, oral, Daily, TAKE WITH FOOD    EPINEPHrine 0.3 mg/0.3 mL injection syringe 0.3 mL, injection, As needed    etonogestrel-eluting contraceptive (Nexplanon) 68 mg implant implant subcutaneous    famotidine (PEPCID) 20 mg, oral, As needed    gabapentin (NEURONTIN) 300 mg, oral, 3 times daily    hydrOXYzine HCL (ATARAX) 25 mg, oral, Nightly, Take 1-2 tablets by mouth at bedtime.    lactobacillus combination no.4 (Probiotic) 3 billion cell capsule 1 capsule, oral, Daily RT    lamoTRIgine (LAMICTAL) 50 mg, oral, Daily    montelukast (SINGULAIR) 10 mg, oral, Nightly    omeprazole (PRILOSEC) 20 mg, oral, Daily, Do not crush or chew.     ondansetron ODT (ZOFRAN-ODT) 4 mg, oral, Every 6 hours PRN    traZODone (Desyrel) 100 mg tablet 1 tablet, oral, Nightly    tretinoin (Retin-A) 0.01 % gel  Topical, Nightly    venlafaxine XR (EFFEXOR-XR) 150 mg, oral, Daily, WITH FOOD @ 9AM WITH 75 MG CAP FOR DAILY DOSE 225 MG    venlafaxine XR (EFFEXOR-XR) 75 mg, oral, Daily, WITH FOOD @ 9AM TAKE WITH 150 MG FOR TOTAL DAILY DOSE  MG    Wegovy 0.25 mg, subcutaneous, Every 7 days

## 2023-06-19 NOTE — PATIENT INSTRUCTIONS
See if can find dates of HPV vaccine (if done)  Check immunization record at home /with mom    If hasn't had it:    Three doses of HPV vaccine should be given at 0, 1 to 2 (typically 2), and 6 months.

## 2023-06-19 NOTE — ASSESSMENT & PLAN NOTE
Will try GLP1 med  I have discussed both the risks and benefits of the treatment plan, including potential adverse effects/benefits of medication as well as not taking medication (and the potential risks of that), and patient understands/agrees with the treatment plan.

## 2023-06-19 NOTE — Clinical Note
Can you see about getting her in sooner with Dr Doyle She is worse Dr Lucas would be good also Or Kami Lainez too Cannot wait until 7/18

## 2023-06-19 NOTE — ASSESSMENT & PLAN NOTE
Will increase Gabapentin to max dose if tolerated at HS and 300mg TID, QID during day  Spine appt ASAP

## 2023-06-20 LAB
CHLAMYDIA TRACH., AMPLIFIED: NEGATIVE
ESTIMATED AVERAGE GLUCOSE FOR HBA1C: 105 MG/DL
HEMOGLOBIN A1C/HEMOGLOBIN TOTAL IN BLOOD: 5.3 %
N. GONORRHEA, AMPLIFIED: NEGATIVE

## 2023-06-21 ENCOUNTER — PATIENT OUTREACH (OUTPATIENT)
Dept: CARE COORDINATION | Facility: CLINIC | Age: 23
End: 2023-06-21
Payer: COMMERCIAL

## 2023-06-21 NOTE — PROGRESS NOTES
Spoke with patient about her needs of getting seen sooner by Dr. John due to increase of neck pain.     Dr. Deras asked me to help facilitate this. Patient states she would be fine being scheduled at whatever location, date and time were not an issue.     I called Dr. John's office at [phone]. I left message for his staff to call me back directly to help get patient seen sooner. I will call patient back once I receive return call from Dr. John's office.     If they are not able to help schedule patient sooner I will look into contact Dr Lucas  Or Dr. Kami Lainez per recommendation from Dr. Deras.

## 2023-06-23 ENCOUNTER — DOCUMENTATION (OUTPATIENT)
Dept: CARE COORDINATION | Facility: CLINIC | Age: 23
End: 2023-06-23
Payer: COMMERCIAL

## 2023-06-23 NOTE — PROGRESS NOTES
Left message again for Dr. John's staff. They did call me back on 6/22 about this patient getting in sooner. She asked if she could look into it and call me back.     I left message asking for an update about getting patient seen sooner.

## 2023-06-28 ENCOUNTER — PATIENT OUTREACH (OUTPATIENT)
Dept: CARE COORDINATION | Facility: CLINIC | Age: 23
End: 2023-06-28
Payer: COMMERCIAL

## 2023-06-28 NOTE — PROGRESS NOTES
Spoke with patient about contacting Dr. Rodrigues's staff back to get scheduled to be seen on Friday 6/30/2023. She didn't answer the specialists office when they called her; they reached out to me asking to facilitate her calling them back.     Patient will call back to get scheduled.

## 2023-06-30 ENCOUNTER — TELEPHONE (OUTPATIENT)
Dept: PRIMARY CARE | Facility: CLINIC | Age: 23
End: 2023-06-30

## 2023-06-30 ENCOUNTER — TELEMEDICINE (OUTPATIENT)
Dept: PRIMARY CARE | Facility: CLINIC | Age: 23
End: 2023-06-30
Payer: COMMERCIAL

## 2023-06-30 DIAGNOSIS — M48.02 CERVICAL SPINAL STENOSIS: Primary | ICD-10-CM

## 2023-06-30 DIAGNOSIS — M50.90 CERVICAL DISC DISEASE: ICD-10-CM

## 2023-06-30 PROCEDURE — 99214 OFFICE O/P EST MOD 30 MIN: CPT | Performed by: NURSE PRACTITIONER

## 2023-06-30 RX ORDER — OXYCODONE AND ACETAMINOPHEN 5; 325 MG/1; MG/1
1 TABLET ORAL EVERY 4 HOURS PRN
Qty: 42 TABLET | Refills: 0 | Status: SHIPPED | OUTPATIENT
Start: 2023-06-30 | End: 2023-07-07

## 2023-06-30 NOTE — PROGRESS NOTES
Assessment/Plan   Problem List Items Addressed This Visit    None    An interactive audio/visual telecommunication system which permits real time communications between the patient (at the originating site) and provider (at the distant site) was utilized to provide this telehealth service.    Verbal consent was requested and obtained from the patient for this telehealth visit.  We have confirmed the patient wishes to see me, Sasha Chandler, a board certified family nurse practitioner with an active [facility] license as well as verified the patient's identity and physical location in [place].    Subjective   Patient ID: Aisha Mares is a 23 y.o. female who presents for medications (/Pt gives concent for virtual encounter/Adjust pain meds/Has a ruptured c5 c 6/July 29th getting steroid injection July 29th with spine specialist/In a lot of pain).  HPI  Saw PM&R today copied:  PLAN:   - reviewed cervical MRI personally with patient.   - to schedule C7-T1 ILESI  - on venlafaxine, but may discuss with PCP cymbalta or topamax   - f/u after injection     Patient advised of the difference between hurt and harm and advised to continue with all normal activities and exercises. Patient verbalized understanding of the above plan and was happy with the care provided.     Iva Lazaro, DO   Physical Medicine and Rehabilitation    6/6/2023 MRI cspine copied:  FINDINGS:  Alignment: The vertebral alignment is within normal limits.     Vertebrae/Intervertebral Discs: The vertebral bodies demonstrate  expected height.  The marrow signal is within normal limits. The  intervertebral discs demonstrate expected signal and morphology.     The spinal cord is normal in caliber and signal.     C1-C2: The cervicomedullary junction appears unremarkable. There is  no central canal stenosis.     C2-C3: There is no posterior disc contour abnormality. There is no  significant central canal or neural foraminal stenosis.     C3-C4: There is no posterior  disc contour abnormality. There is no  significant central canal or neural foraminal stenosis.     C4-C5: There is no posterior disc contour abnormality. There is no  significant central canal or neural foraminal stenosis.     C5-C6: There is a focal left paracentral disc protrusion with  resultant mild spinal canal stenosis and with flattening of the left  ventral spinal cord without spinal cord signal abnormality and mild  narrowing of the left neural foramen. Tiny right paracentral  protrusion results in mild right neural foraminal stenosis.     C6-C7: There is no posterior disc contour abnormality. There is no  significant central canal or neural foraminal stenosis.     C7-T1: There is no posterior disc contour abnormality. There is no  significant central canal or neural foraminal stenosis.     The upper thoracic vertebrae and spinal canal are unremarkable.     IMPRESSION:  Mild spinal canal stenosis at C5-C6 with minimal flattening of the  left ventral spinal cord without spinal cord signal abnormality and  with associated mild left neural foraminal stenosis.  ___  This all started 3 y ago  Over last 6 months pain worsened  Can't drive as she can't look over L shoulder  PT failed  Medrol wo much relief    Base cspine  Relieved by: not moving her head  Exacerbated by: movement of her head  Worst 8/10  Now 5/10 wo movement, then 8-9/10 with any head movement   She considers 5-6/10 as manageable for her   Burning pinch radiates onto L shoulder and down her back  Spasms   - no relief with muscle relaxant flexeril and robaxin   Heat with some relief but causes more spasms  Lucinda 300 mg TID   - Not much difference   Meloxicam wo relief   Tylenol #3 q4   - no difference in pain level   Sleeping with supportive pillow     Review of Systems   All other systems reviewed and are negative.      BP Readings from Last 3 Encounters:   06/19/23 118/70   06/06/23 108/75   03/10/23 105/76      Wt Readings from Last 3 Encounters:  "  06/19/23 118 kg (259 lb 2 oz)   06/06/23 115 kg (254 lb 4 oz)   03/10/23 113 kg (248 lb 9.6 oz)      BMI:   Estimated body mass index is 48.17 kg/m² as calculated from the following:    Height as of 6/19/23: 1.562 m (5' 1.5\").    Weight as of 6/19/23: 118 kg (259 lb 2 oz).    Objective   Physical Exam  Gen: Alert, NAD  Respiratory:  resp effort NL  Neuro:  coordination intact   Mood: normal      "

## 2023-06-30 NOTE — ASSESSMENT & PLAN NOTE
Not much relief with increased noah or tylenol #3  After much discussion to r/b, I dc'd tylenol #3 and added in percocet 5/325   Tells me manageable pain level is 5-6/10  Beyond this RX, she will need IO appt for [facility] compliance   Keep scheduled injection

## 2023-06-30 NOTE — TELEPHONE ENCOUNTER
Patient went to ortho/spine doctor today did not really find anything else for her, she is scheduled for an injection on 7/28/23 but the pain meds you gave her are not helping her, she was crying as she left this message she said due to the pain she is in.  Asking if anything else you can do?  She said she is currently taking Gabapentin and Tylenol with codiene #3

## 2023-07-28 ENCOUNTER — HOSPITAL ENCOUNTER (OUTPATIENT)
Dept: DATA CONVERSION | Facility: HOSPITAL | Age: 23
End: 2023-07-28
Attending: PHYSICAL MEDICINE & REHABILITATION | Admitting: PHYSICAL MEDICINE & REHABILITATION
Payer: COMMERCIAL

## 2023-07-28 DIAGNOSIS — M54.12 RADICULOPATHY, CERVICAL REGION: ICD-10-CM

## 2023-07-28 DIAGNOSIS — G89.29 OTHER CHRONIC PAIN: ICD-10-CM

## 2023-07-28 DIAGNOSIS — M48.02 SPINAL STENOSIS, CERVICAL REGION: ICD-10-CM

## 2023-08-10 DIAGNOSIS — F32.A ANXIETY AND DEPRESSION: ICD-10-CM

## 2023-08-10 DIAGNOSIS — F41.9 ANXIETY AND DEPRESSION: ICD-10-CM

## 2023-08-10 RX ORDER — VENLAFAXINE HYDROCHLORIDE 150 MG/1
150 CAPSULE, EXTENDED RELEASE ORAL DAILY
Qty: 90 CAPSULE | Refills: 3 | Status: SHIPPED | OUTPATIENT
Start: 2023-08-10 | End: 2024-01-18 | Stop reason: SDUPTHER

## 2023-09-26 ENCOUNTER — LAB (OUTPATIENT)
Dept: LAB | Facility: LAB | Age: 23
End: 2023-09-26
Payer: COMMERCIAL

## 2023-09-26 ENCOUNTER — OFFICE VISIT (OUTPATIENT)
Dept: PRIMARY CARE | Facility: CLINIC | Age: 23
End: 2023-09-26
Payer: COMMERCIAL

## 2023-09-26 VITALS
HEART RATE: 85 BPM | TEMPERATURE: 98.2 F | BODY MASS INDEX: 47.03 KG/M2 | DIASTOLIC BLOOD PRESSURE: 80 MMHG | OXYGEN SATURATION: 97 % | SYSTOLIC BLOOD PRESSURE: 125 MMHG | WEIGHT: 253 LBS

## 2023-09-26 DIAGNOSIS — F31.81 BIPOLAR 2 DISORDER (MULTI): Primary | ICD-10-CM

## 2023-09-26 DIAGNOSIS — F31.81 BIPOLAR 2 DISORDER (MULTI): ICD-10-CM

## 2023-09-26 DIAGNOSIS — Z23 ENCOUNTER FOR IMMUNIZATION: ICD-10-CM

## 2023-09-26 DIAGNOSIS — M54.2 NECK PAIN: ICD-10-CM

## 2023-09-26 DIAGNOSIS — M54.12 CERVICAL RADICULOPATHY: ICD-10-CM

## 2023-09-26 PROBLEM — Z79.899 MEDICATION MANAGEMENT: Status: RESOLVED | Noted: 2023-05-27 | Resolved: 2023-09-26

## 2023-09-26 LAB
ALANINE AMINOTRANSFERASE (SGPT) (U/L) IN SER/PLAS: 22 U/L (ref 7–45)
ALBUMIN (G/DL) IN SER/PLAS: 4.2 G/DL (ref 3.4–5)
ALKALINE PHOSPHATASE (U/L) IN SER/PLAS: 75 U/L (ref 33–110)
ANION GAP IN SER/PLAS: 9 MMOL/L (ref 10–20)
ASPARTATE AMINOTRANSFERASE (SGOT) (U/L) IN SER/PLAS: 15 U/L (ref 9–39)
BASOPHILS (10*3/UL) IN BLOOD BY AUTOMATED COUNT: 0.04 X10E9/L (ref 0–0.1)
BASOPHILS/100 LEUKOCYTES IN BLOOD BY AUTOMATED COUNT: 0.6 % (ref 0–2)
BILIRUBIN TOTAL (MG/DL) IN SER/PLAS: 0.6 MG/DL (ref 0–1.2)
CALCIUM (MG/DL) IN SER/PLAS: 9.2 MG/DL (ref 8.6–10.3)
CARBON DIOXIDE, TOTAL (MMOL/L) IN SER/PLAS: 30 MMOL/L (ref 21–32)
CHLORIDE (MMOL/L) IN SER/PLAS: 104 MMOL/L (ref 98–107)
CREATININE (MG/DL) IN SER/PLAS: 0.94 MG/DL (ref 0.5–1.05)
EOSINOPHILS (10*3/UL) IN BLOOD BY AUTOMATED COUNT: 0.95 X10E9/L (ref 0–0.7)
EOSINOPHILS/100 LEUKOCYTES IN BLOOD BY AUTOMATED COUNT: 14.8 % (ref 0–6)
ERYTHROCYTE DISTRIBUTION WIDTH (RATIO) BY AUTOMATED COUNT: 12.8 % (ref 11.5–14.5)
ERYTHROCYTE MEAN CORPUSCULAR HEMOGLOBIN CONCENTRATION (G/DL) BY AUTOMATED: 33.7 G/DL (ref 32–36)
ERYTHROCYTE MEAN CORPUSCULAR VOLUME (FL) BY AUTOMATED COUNT: 86 FL (ref 80–100)
ERYTHROCYTES (10*6/UL) IN BLOOD BY AUTOMATED COUNT: 5.06 X10E12/L (ref 4–5.2)
GFR FEMALE: 87 ML/MIN/1.73M2
GLUCOSE (MG/DL) IN SER/PLAS: 84 MG/DL (ref 74–99)
HEMATOCRIT (%) IN BLOOD BY AUTOMATED COUNT: 43.3 % (ref 36–46)
HEMOGLOBIN (G/DL) IN BLOOD: 14.6 G/DL (ref 12–16)
IMMATURE GRANULOCYTES/100 LEUKOCYTES IN BLOOD BY AUTOMATED COUNT: 0.2 % (ref 0–0.9)
LEUKOCYTES (10*3/UL) IN BLOOD BY AUTOMATED COUNT: 6.4 X10E9/L (ref 4.4–11.3)
LYMPHOCYTES (10*3/UL) IN BLOOD BY AUTOMATED COUNT: 2.15 X10E9/L (ref 1.2–4.8)
LYMPHOCYTES/100 LEUKOCYTES IN BLOOD BY AUTOMATED COUNT: 33.5 % (ref 13–44)
MONOCYTES (10*3/UL) IN BLOOD BY AUTOMATED COUNT: 0.51 X10E9/L (ref 0.1–1)
MONOCYTES/100 LEUKOCYTES IN BLOOD BY AUTOMATED COUNT: 7.9 % (ref 2–10)
NEUTROPHILS (10*3/UL) IN BLOOD BY AUTOMATED COUNT: 2.76 X10E9/L (ref 1.2–7.7)
NEUTROPHILS/100 LEUKOCYTES IN BLOOD BY AUTOMATED COUNT: 43 % (ref 40–80)
PLATELETS (10*3/UL) IN BLOOD AUTOMATED COUNT: 280 X10E9/L (ref 150–450)
POTASSIUM (MMOL/L) IN SER/PLAS: 4.3 MMOL/L (ref 3.5–5.3)
PROTEIN TOTAL: 6.6 G/DL (ref 6.4–8.2)
SODIUM (MMOL/L) IN SER/PLAS: 139 MMOL/L (ref 136–145)
UREA NITROGEN (MG/DL) IN SER/PLAS: 10 MG/DL (ref 6–23)

## 2023-09-26 PROCEDURE — 90471 IMMUNIZATION ADMIN: CPT | Performed by: NURSE PRACTITIONER

## 2023-09-26 PROCEDURE — 80175 DRUG SCREEN QUAN LAMOTRIGINE: CPT

## 2023-09-26 PROCEDURE — 3008F BODY MASS INDEX DOCD: CPT | Performed by: NURSE PRACTITIONER

## 2023-09-26 PROCEDURE — 99213 OFFICE O/P EST LOW 20 MIN: CPT | Performed by: NURSE PRACTITIONER

## 2023-09-26 PROCEDURE — 85025 COMPLETE CBC W/AUTO DIFF WBC: CPT

## 2023-09-26 PROCEDURE — 80053 COMPREHEN METABOLIC PANEL: CPT

## 2023-09-26 PROCEDURE — 36415 COLL VENOUS BLD VENIPUNCTURE: CPT

## 2023-09-26 PROCEDURE — 90686 IIV4 VACC NO PRSV 0.5 ML IM: CPT | Performed by: NURSE PRACTITIONER

## 2023-09-26 PROCEDURE — 1036F TOBACCO NON-USER: CPT | Performed by: NURSE PRACTITIONER

## 2023-09-26 RX ORDER — GABAPENTIN 300 MG/1
300 CAPSULE ORAL 3 TIMES DAILY
Qty: 90 CAPSULE | Refills: 5
Start: 2023-09-26 | End: 2024-03-24

## 2023-09-26 RX ORDER — LAMOTRIGINE 100 MG/1
100 TABLET ORAL DAILY
Qty: 90 TABLET | Refills: 3 | Status: SHIPPED | OUTPATIENT
Start: 2023-09-26 | End: 2024-09-25

## 2023-09-26 ASSESSMENT — PATIENT HEALTH QUESTIONNAIRE - PHQ9
1. LITTLE INTEREST OR PLEASURE IN DOING THINGS: NOT AT ALL
2. FEELING DOWN, DEPRESSED OR HOPELESS: NOT AT ALL
SUM OF ALL RESPONSES TO PHQ9 QUESTIONS 1 AND 2: 0

## 2023-09-26 NOTE — ASSESSMENT & PLAN NOTE
Psychiatrist left  Will increase dosage per her psych plan  Therapeutic max dose 200mg daily  Will increase to 100mg daily  Lab work today to check kidney, liver, and lamictal level  Psychiatry referral placed  FU in 1-2 mo

## 2023-09-26 NOTE — PROGRESS NOTES
Subjective   Patient ID: Aisha Mares is a 23 y.o. female who presents for discuss medication (Discuss medication/Started lamectal last may psych Dr moved has not found another would like us to manage it until finds another Dr).    Psychiatrist left  Having hard time finding replacement  Would like us to increase lamictal  Feeling good on it  But wants to get to therapeutic dose  100mg then 200mg  Went down on effexor from 150+75 to just 150mg  No AE to decreasing        Review of Systems  ROS completely negative except what was mentioned in the HPI.  Problem List, surgical, social, and family histories which were reviewed and updated as necessary within the EMR. I also personally reviewed the notes, labs, and imaging that pertained to what was documented or discussed in the HPI.    Objective   Physical Exam  Vitals and nursing note reviewed.   Constitutional:       General: She is not in acute distress.     Appearance: Normal appearance.   HENT:      Head: Normocephalic and atraumatic.      Right Ear: External ear normal.      Left Ear: External ear normal.      Nose: Nose normal.      Mouth/Throat:      Mouth: Mucous membranes are moist.   Eyes:      Extraocular Movements: Extraocular movements intact.      Conjunctiva/sclera: Conjunctivae normal.      Pupils: Pupils are equal, round, and reactive to light.   Cardiovascular:      Rate and Rhythm: Normal rate and regular rhythm.      Heart sounds: Normal heart sounds.   Pulmonary:      Effort: Pulmonary effort is normal.      Breath sounds: Normal breath sounds.   Musculoskeletal:         General: Normal range of motion.      Cervical back: Normal range of motion and neck supple.   Skin:     General: Skin is warm and dry.   Neurological:      General: No focal deficit present.      Mental Status: She is alert and oriented to person, place, and time. Mental status is at baseline.   Psychiatric:         Mood and Affect: Mood normal.         Behavior: Behavior normal.          Thought Content: Thought content normal.         Judgment: Judgment normal.         /80   Pulse 85   Temp 36.8 °C (98.2 °F)   Wt 115 kg (253 lb)   SpO2 97%   BMI 47.03 kg/m²     Assessment/Plan    Problem List Items Addressed This Visit       Bipolar 2 disorder (CMS/HCC) - Primary    Overview     Lamictal & Effexor         Current Assessment & Plan     Psychiatrist left  Will increase dosage per her psych plan  Therapeutic max dose 200mg daily  Will increase to 100mg daily  Lab work today to check kidney, liver, and lamictal level  Psychiatry referral placed  FU in 1-2 mo         Relevant Medications    lamoTRIgine (LaMICtal) 100 mg tablet    Other Relevant Orders    Comprehensive Metabolic Panel    CBC and Auto Differential    Lamotrigine level    Referral to Psychiatry     Other Visit Diagnoses       Encounter for immunization        Relevant Orders    Flu vaccine (IIV4) age 6 months and greater, preservative free (Completed)    Neck pain        Relevant Medications    gabapentin (Neurontin) 300 mg capsule    Cervical radiculopathy        XR normal in 11/22  Sx present for 3 years  Failed PT and NSAIDs  Will get MRI  Medrol and Gabepentin for pain    Relevant Medications    gabapentin (Neurontin) 300 mg capsule

## 2023-09-27 LAB — LAMOTRIGINE LEVEL: <1 UG/ML (ref 2.5–15)

## 2023-09-29 VITALS
BODY MASS INDEX: 48.03 KG/M2 | WEIGHT: 254.41 LBS | TEMPERATURE: 96.8 F | SYSTOLIC BLOOD PRESSURE: 142 MMHG | HEART RATE: 88 BPM | DIASTOLIC BLOOD PRESSURE: 85 MMHG | RESPIRATION RATE: 18 BRPM | HEIGHT: 61 IN

## 2023-09-30 NOTE — H&P
History of Present Illness:   Pregnant/Lactating:  ·  Are You Pregnant no   ·  Are You Currently Breastfeeding no     History Present Illness:  Reason for surgery: Chronic neck and arm pain   HPI:    Chronic neck and arm pain refractory to conservative treatment, achy and burning, worse with activity, better at rest, trying to avoid surgery, can be a 9/10.    Allergies:        Allergies:  ·  No Known Allergies :     Home Medication Review:   Home Medications Reviewed: yes     Impression/Procedure:   ·  Impression and Planned Procedure: Cervical radiculitis  Plan for C7/T1 interlaminar PAUL       ERAS (Enhanced Recovery After Surgery):  ·  ERAS Patient: no       Vital Signs:  Temperature C: 36 degrees C   Temperature F: 96.8 degrees F   Heart Rate: 88 beats per minute   Respiratory Rate: 18 breath per minute   Blood Pressure Systolic: 142 mm/Hg   Blood Pressure Diastolic: 85 mm/Hg     Physical Exam by System:    Constitutional: Well developed, awake/alert/oriented,  no distress, alert and cooperative   Eyes: EOMI, clear sclera   ENMT: mucous membranes moist, no apparent injury,  no lesions seen   Head/Neck: Neck supple, no apparent injury, trachea  midline   Respiratory/Thorax: Patent airways, non-labored breathing  with good chest expansion   Cardiovascular: no sig. edema   Extremities: normal extremities, no cyanosis edema,  contusions or wounds, no clubbing   Psychological: Appropriate mood and behavior   Skin: Warm and dry, no lesions, no rashes     Consent:   COVID-19 Consent:  ·  COVID-19 Risk Consent Surgeon has reviewed key risks related to the risk of glenna COVID-19 and if they contract COVID-19 what the risks are.       Electronic Signatures:  Jose John)  (Signed 28-Jul-2023 13:21)   Authored: History of Present Illness, Allergies, Home  Medication Review, Impression/Procedure, ERAS, Physical Exam, Consent, Note Completion      Last Updated: 28-Jul-2023 13:21 by Jose John)

## 2023-10-02 NOTE — OP NOTE
Post Operative Note:     PreOp Diagnosis: Cervical radiculitis   Post-Procedure Diagnosis: same   Procedure: 1. C7/T1 interlaminar PAUL (left paramedian)  2.   3.   4.   5.   Surgeon: RONI John MD   Resident/Fellow/Other Assistant: MADDY   Estimated Blood Loss (mL): none   Specimen: no   Findings: NA     Operative Report Dictated:  Dictation: not applicable - note contains Operative  Report   Operative Report:    Fluoroscopic Cervical Epidural Steroid Injection - Interlaminar C7/T1 (Left Paramedian)    Indications:  Cervical Stenosis.  Neck Pain    The risks and benefits of the procedure were discussed extensively with the patient. The verbal and written informed consent of the patient was obtained and the following procedure was performed:    The patient was placed in the prone position.  The Cervical area was prepped and draped in a sterile fashion. DuraPrep was used per skin prep guidelines.  We waited 3 minutes by timer for the DuraPrep to dry before proceed with the procedure.    Using sterile technique the superficial skin was anesthetized with 1% Lidocaine without epinephrine. A 18 gauge Tuohy needle was introduced into the epidural space under fluoroscopy using hanging drop/loss of resistance technique at the C7/T1 level.    A dry tap was obtained. A myelogram without dural puncture was obtained using Omnipaque , approximately 1cc contrast was injected confirming epidural placement of the needle.  Contrast flow was noted in the epidural space.    After this a total of 10 mg Dexamethasone and 4 cc of 1.0% preservative free Lidocaine was injected into the epidural space.    The needle was subsequently removed. Vital signs remained normal. Pulse oximeter was used throughout the procedure and the patient's pulse and oxygen saturation remained within normal limits.  The patient tolerated the procedure well.  There were no complications.    The patient was instructed to apply ice over the injection site for  twenty minutes every two hours for the next twenty-four to forty-eight hours.  The patient was also instructed to contact me if there is any exacerbation of the symptoms. Post procedure  instruction sheet was given.      Attestation:   Note Completion:  Attending Attestation I performed the procedure without a resident         Electronic Signatures:  Jose John)  (Signed 28-Jul-2023 14:43)   Authored: Post Operative Note, Note Completion      Last Updated: 28-Jul-2023 14:43 by Jose John)

## 2024-01-18 DIAGNOSIS — F51.04 CHRONIC INSOMNIA: ICD-10-CM

## 2024-01-18 DIAGNOSIS — F41.9 ANXIETY AND DEPRESSION: ICD-10-CM

## 2024-01-18 DIAGNOSIS — F32.A ANXIETY AND DEPRESSION: ICD-10-CM

## 2024-01-18 RX ORDER — TRAZODONE HYDROCHLORIDE 100 MG/1
100 TABLET ORAL NIGHTLY
Qty: 90 TABLET | Refills: 3 | Status: SHIPPED | OUTPATIENT
Start: 2024-01-18

## 2024-01-18 RX ORDER — VENLAFAXINE HYDROCHLORIDE 150 MG/1
150 CAPSULE, EXTENDED RELEASE ORAL DAILY
Qty: 90 CAPSULE | Refills: 3 | Status: SHIPPED | OUTPATIENT
Start: 2024-01-18

## 2024-01-18 NOTE — TELEPHONE ENCOUNTER
Pt left VM requesting refills and also asked if there was something else she could take because the Wegovy has not been available in a lot of pharmacies.  Please advise

## 2024-01-24 ENCOUNTER — PATIENT MESSAGE (OUTPATIENT)
Dept: PRIMARY CARE | Facility: CLINIC | Age: 24
End: 2024-01-24
Payer: COMMERCIAL

## 2024-01-24 DIAGNOSIS — E66.01 CLASS 3 SEVERE OBESITY WITH BODY MASS INDEX (BMI) OF 45.0 TO 49.9 IN ADULT, UNSPECIFIED OBESITY TYPE, UNSPECIFIED WHETHER SERIOUS COMORBIDITY PRESENT (MULTI): ICD-10-CM

## 2024-03-15 ENCOUNTER — TELEPHONE (OUTPATIENT)
Dept: PRIMARY CARE | Facility: CLINIC | Age: 24
End: 2024-03-15
Payer: COMMERCIAL

## 2024-03-15 NOTE — TELEPHONE ENCOUNTER
Called pt left message to call back regarding pa denial for ozempic due to her not having dx diabetes type 2  [facility] if chooses

## 2024-03-26 DIAGNOSIS — E66.01 CLASS 3 SEVERE OBESITY DUE TO EXCESS CALORIES WITH SERIOUS COMORBIDITY AND BODY MASS INDEX (BMI) OF 45.0 TO 49.9 IN ADULT (MULTI): ICD-10-CM

## 2024-03-26 NOTE — TELEPHONE ENCOUNTER
Patient calling wants to see if she can have rx for zepbound sent to [facility]   Hoping she can get rx through their pharmacy at a discount  Formatted   Please advise

## 2024-03-27 DIAGNOSIS — E66.01 CLASS 3 SEVERE OBESITY DUE TO EXCESS CALORIES WITH SERIOUS COMORBIDITY AND BODY MASS INDEX (BMI) OF 45.0 TO 49.9 IN ADULT (MULTI): ICD-10-CM

## 2024-03-28 RX ORDER — SEMAGLUTIDE 0.68 MG/ML
0.25 INJECTION, SOLUTION SUBCUTANEOUS
Qty: 2 ML | Refills: 3 | Status: SHIPPED | OUTPATIENT
Start: 2024-03-28

## 2024-05-09 DIAGNOSIS — J45.40 ASTHMA IN ADULT, MODERATE PERSISTENT, UNCOMPLICATED (HHS-HCC): Primary | ICD-10-CM

## 2024-05-09 RX ORDER — ALBUTEROL SULFATE 90 UG/1
AEROSOL, METERED RESPIRATORY (INHALATION)
Qty: 18 G | Refills: 3 | Status: SHIPPED | OUTPATIENT
Start: 2024-05-09

## 2024-05-09 RX ORDER — BUDESONIDE AND FORMOTEROL FUMARATE DIHYDRATE 160; 4.5 UG/1; UG/1
2 AEROSOL RESPIRATORY (INHALATION) 2 TIMES DAILY
Qty: 10.2 G | Refills: 3 | Status: SHIPPED | OUTPATIENT
Start: 2024-05-09

## 2024-07-05 ENCOUNTER — APPOINTMENT (OUTPATIENT)
Dept: PRIMARY CARE | Facility: CLINIC | Age: 24
End: 2024-07-05
Payer: COMMERCIAL

## 2024-07-11 ENCOUNTER — APPOINTMENT (OUTPATIENT)
Dept: PRIMARY CARE | Facility: CLINIC | Age: 24
End: 2024-07-11
Payer: COMMERCIAL

## 2024-07-11 VITALS
HEIGHT: 62 IN | TEMPERATURE: 98 F | SYSTOLIC BLOOD PRESSURE: 124 MMHG | OXYGEN SATURATION: 96 % | BODY MASS INDEX: 46.28 KG/M2 | DIASTOLIC BLOOD PRESSURE: 77 MMHG | WEIGHT: 251.5 LBS | HEART RATE: 100 BPM

## 2024-07-11 DIAGNOSIS — Z97.5 NEXPLANON IN PLACE: ICD-10-CM

## 2024-07-11 DIAGNOSIS — R21 RASH: ICD-10-CM

## 2024-07-11 DIAGNOSIS — F31.81 BIPOLAR 2 DISORDER (MULTI): ICD-10-CM

## 2024-07-11 DIAGNOSIS — Z00.00 ROUTINE HEALTH MAINTENANCE: Primary | ICD-10-CM

## 2024-07-11 DIAGNOSIS — F32.A ANXIETY AND DEPRESSION: ICD-10-CM

## 2024-07-11 DIAGNOSIS — F51.04 CHRONIC INSOMNIA: ICD-10-CM

## 2024-07-11 DIAGNOSIS — G47.33 OBSTRUCTIVE SLEEP APNEA: ICD-10-CM

## 2024-07-11 DIAGNOSIS — J45.40 ASTHMA IN ADULT, MODERATE PERSISTENT, UNCOMPLICATED (HHS-HCC): ICD-10-CM

## 2024-07-11 DIAGNOSIS — G47.33 MODERATE OBSTRUCTIVE SLEEP APNEA: ICD-10-CM

## 2024-07-11 DIAGNOSIS — E66.01 CLASS 3 SEVERE OBESITY DUE TO EXCESS CALORIES WITH SERIOUS COMORBIDITY AND BODY MASS INDEX (BMI) OF 45.0 TO 49.9 IN ADULT (MULTI): ICD-10-CM

## 2024-07-11 DIAGNOSIS — M48.02 CERVICAL SPINAL STENOSIS: ICD-10-CM

## 2024-07-11 DIAGNOSIS — F41.9 ANXIETY AND DEPRESSION: ICD-10-CM

## 2024-07-11 DIAGNOSIS — E55.9 VITAMIN D DEFICIENCY: ICD-10-CM

## 2024-07-11 DIAGNOSIS — F60.5 OBSESSIVE COMPULSIVE PERSONALITY DISORDER (MULTI): ICD-10-CM

## 2024-07-11 DIAGNOSIS — M50.90 CERVICAL DISC DISEASE: ICD-10-CM

## 2024-07-11 DIAGNOSIS — Z30.46 NEXPLANON REMOVAL: ICD-10-CM

## 2024-07-11 DIAGNOSIS — R06.83 SNORING: ICD-10-CM

## 2024-07-11 PROBLEM — J35.1 ENLARGED TONSILS: Status: RESOLVED | Noted: 2023-01-18 | Resolved: 2024-07-11

## 2024-07-11 PROCEDURE — 1036F TOBACCO NON-USER: CPT | Performed by: INTERNAL MEDICINE

## 2024-07-11 PROCEDURE — 99214 OFFICE O/P EST MOD 30 MIN: CPT | Performed by: INTERNAL MEDICINE

## 2024-07-11 PROCEDURE — 3008F BODY MASS INDEX DOCD: CPT | Performed by: INTERNAL MEDICINE

## 2024-07-11 PROCEDURE — 99395 PREV VISIT EST AGE 18-39: CPT | Performed by: INTERNAL MEDICINE

## 2024-07-11 PROCEDURE — RXMED WILLOW AMBULATORY MEDICATION CHARGE

## 2024-07-11 RX ORDER — FLUOCINONIDE 0.5 MG/G
CREAM TOPICAL 2 TIMES DAILY
Qty: 30 G | Refills: 1 | Status: SHIPPED | OUTPATIENT
Start: 2024-07-11 | End: 2025-07-11

## 2024-07-11 RX ORDER — CYCLOBENZAPRINE HCL 10 MG
10 TABLET ORAL 3 TIMES DAILY PRN
COMMUNITY
End: 2024-07-11 | Stop reason: SDUPTHER

## 2024-07-11 RX ORDER — METHOCARBAMOL 500 MG/1
1 TABLET, FILM COATED ORAL 4 TIMES DAILY
COMMUNITY
Start: 2023-02-15 | End: 2024-07-11 | Stop reason: WASHOUT

## 2024-07-11 RX ORDER — QUETIAPINE FUMARATE 25 MG/1
25 TABLET, FILM COATED ORAL NIGHTLY
COMMUNITY
Start: 2024-05-08 | End: 2024-07-11 | Stop reason: WASHOUT

## 2024-07-11 RX ORDER — CYCLOBENZAPRINE HCL 10 MG
10 TABLET ORAL 3 TIMES DAILY PRN
Qty: 270 TABLET | Refills: 3 | Status: SHIPPED | OUTPATIENT
Start: 2024-07-11

## 2024-07-11 RX ORDER — MELOXICAM 15 MG/1
15 TABLET ORAL DAILY PRN
COMMUNITY
End: 2024-07-11 | Stop reason: WASHOUT

## 2024-07-11 RX ORDER — ADAPALENE AND BENZOYL PEROXIDE GEL, 0.1%/2.5% 1; 25 MG/G; MG/G
1 GEL TOPICAL NIGHTLY
COMMUNITY
Start: 2015-10-21

## 2024-07-11 RX ORDER — GABAPENTIN 300 MG/1
300 CAPSULE ORAL 3 TIMES DAILY
Qty: 270 CAPSULE | Refills: 3 | Status: SHIPPED | OUTPATIENT
Start: 2024-07-11

## 2024-07-11 NOTE — PROGRESS NOTES
ANNUAL CPE VISIT and other issues  Chief Complaint   Patient presents with    Annual Exam     Referrals needed- sleep study (she thinks she has sleep apnea)  Chronic neck pain  Gynecology needs birth control implant removed  Need new RX for wegovy or zepbouncd    Did PT last year, didn't help  Did Epidural, didn't help  No relief with meds  Saw Spine in 8/23 (copied)  IMPRESSION: 23 year old female who presents with left sided neck and arm pain. There are no symptoms concerning for progressive neurological compromise at this time.   - L C5 radicular pain   - discogenic pain   Cervical MRI 6/16/2023: broad based disc bulge at C5-6 with mild bilateral foraminal impingement at C6  Some elements of myofascial pain, history of mood disorder, PTSD and eating disorder  PLAN:   -Continue with physical therapy  -See [facility] for lifestyle approaches to pain, and healthy living including weight loss  -Consider acupuncture or massage  -If arm pain returns and is severe she will call to schedule C7-T1 ILESI  -Continue methocarbamol  - on venlafaxine, but may discuss with PCP cymbalta or topamax with other providers  - f/u as needed for now, could transition prescription care to other providers, could see Dr. Tayler Parson D.O. in follow-up, also but I would certainly see her again if needed    States she did ring test at home and was +  Feels has sleep apnea  Snores  Father has BARRETT    Component      Latest Ref Rng 9/26/2023   LEUKOCYTES (10*3/UL) IN BLOOD BY AUTOMATED COUNT, Nigerian      4.4 - 11.3 x10E9/L 6.4    ERYTHROCYTES (10*6/UL) IN BLOOD BY AUTOMATED COUNT, Nigerian      4.00 - 5.20 x10E12/L 5.06    HEMOGLOBIN      12.0 - 16.0 g/dL 14.6    HEMATOCRIT      36.0 - 46.0 % 43.3    MCV      80 - 100 fL 86    MCHC      32.0 - 36.0 g/dL 33.7    PLATELETS (10*3/UL) IN BLOOD AUTOMATED COUNT, Nigerian      150 - 450 x10E9/L 280    RED CELL DISTRIBUTION WIDTH      11.5 - 14.5 % 12.8    NEUTROPHILS/100 LEUKOCYTES  IN BLOOD BY AUTOMATED COUNT, Maldivian      40.0 - 80.0 % 43.0    Immature Granulocytes %, Automated      0.0 - 0.9 % 0.2    Lymphocytes %      13.0 - 44.0 % 33.5    Monocytes %      2.0 - 10.0 % 7.9    Eosinophils %      0.0 - 6.0 % 14.8    Basophils %      0.0 - 2.0 % 0.6    NEUTROPHILS (10*3/UL) IN BLOOD BY AUTOMATED COUNT, Maldivian      1.20 - 7.70 x10E9/L 2.76    Lymphocytes Absolute      1.20 - 4.80 x10E9/L 2.15    Monocytes Absolute      0.10 - 1.00 x10E9/L 0.51    Eosinophils Absolute      0.00 - 0.70 x10E9/L 0.95 (H)    Basophils Absolute      0.00 - 0.10 x10E9/L 0.04    GLUCOSE      74 - 99 mg/dL 84    SODIUM      136 - 145 mmol/L 139    POTASSIUM      3.5 - 5.3 mmol/L 4.3    CHLORIDE      98 - 107 mmol/L 104    Bicarbonate      21 - 32 mmol/L 30    Anion Gap      10 - 20 mmol/L 9 (L)    Blood Urea Nitrogen      6 - 23 mg/dL 10    Creatinine      0.50 - 1.05 mg/dL 0.94    GFR Female      >90 mL/min/1.73m2 87    Calcium      8.6 - 10.3 mg/dL 9.2    Albumin      3.4 - 5.0 g/dL 4.2    Alkaline Phosphatase      33 - 110 U/L 75    Total Protein      6.4 - 8.2 g/dL 6.6    AST      9 - 39 U/L 15    Bilirubin Total      0.0 - 1.2 mg/dL 0.6    ALT      7 - 45 U/L 22    LAMOTRIGINE LEVEL      2.5 - 15.0 ug/mL <1.0 !         Assessment and Plan:  Problem List Items Addressed This Visit          High    Routine health maintenance - Primary    Overview     COVID-19 vaccine (PFIZER-BIONTECH)4/22/2021, 3/26/2021, 1/15/22  Influenza 9/23/21, 9/26/23  TDAP 7/14/22  DTAP/IPV5/24/2004  MMR5/24/2004  Meningococcal Conjugate MCV4P Vaccine, IM3/26/2015  PAP 8/17/21 (-)  GC/Chlamydia 8/17/21 (-)  Hep C Ab (-) 9/11/18  HIV 9/11/18 (-)  Nexplanon in place         Current Assessment & Plan     Annual Wellness exam completed   Preventive Health History reviewed  Ordered:   Labs    PAP due, will see GYN to have Nexplanon removed and do it then           Relevant Orders    Comprehensive Metabolic Panel    CBC and Auto Differential     Lipid Panel    Urinalysis with Reflex Culture and Microscopic       Medium    Anxiety and depression    Overview     Failed SSRI (Prozac ineffective and adverse effects at high doses), Cymbalta/Celexa/Zoloft ineffective  Previously on Wellbutrin, stopped in 11/21  Klonopin stopped in 9/2021  On Effexor, dose increased 4/22  s/p inpatient admission 3/22 for passive SI, follwed by admission to IOP at ED clinic  Managed well with Effexor  [facility]  Seeing Counselor         Asthma in adult, moderate persistent, uncomplicated ([facility]HS-HCC)    Overview     On Symbicort  prn albuterol         Bipolar 2 disorder (Multi)    Overview     Lamictal & Effexor         Relevant Orders    TSH with reflex to Free T4 if abnormal    Cervical disc disease    Overview     MRI 6/23: C5-C6: There is a focal left paracentral disc protrusion with  resultant mild spinal canal stenosis and with flattening of the left  ventral spinal cord without spinal cord signal abnormality and mild  narrowing of the left neural foramen. Tiny right paracentral  protrusion results in mild right neural foraminal stenosis.  S/P Spine consult  S/p C7-T1 ILESI         Current Assessment & Plan     Will have her see MR         Relevant Medications    gabapentin (Neurontin) 300 mg capsule    cyclobenzaprine (Flexeril) 10 mg tablet    Cervical spinal stenosis    Overview     6/23: MRI Cervical spine: Mild spinal canal stenosis at C5-C6 with minimal flattening of the  left ventral spinal cord without spinal cord signal abnormality and  with associated mild left neural foraminal stenosis  S/P Spine consult  S/p C7-T1 ILESI         Current Assessment & Plan     See Physical Therapy   Clari Sharif/[facility]  [phone]           Chronic insomnia    Overview     Using Trazodone, hydroxyzine  Used to use Klonopin prn (stopped 9/21)         Class 3 severe obesity due to excess calories with body mass index (BMI) of 45.0 to 49.9 in adult (Multi)     "Overview     No Family hx MEN/MTC  NO hx pancreatitis         Current Assessment & Plan     Start Zepbound         Relevant Medications    tirzepatide, weight loss, (Zepbound) 2.5 mg/0.5 mL injection    Other Relevant Orders    TSH with reflex to Free T4 if abnormal    Moderate obstructive sleep apnea    Current Assessment & Plan     Needs to do HST         Nexplanon in place    Overview     8/17/21         Obsessive compulsive personality disorder (Multi)    Overview     Officially diagnosed 4/22 after inpatient admission [facility] followed by IOP eating D/O clinic         Vitamin D deficiency    Relevant Orders    Vitamin D 25-Hydroxy,Total (for eval of Vitamin D levels)     Other Visit Diagnoses       Obstructive sleep apnea        Snoring        Trevin has this  will get HST and likely start CPAP  Weight loss will help    Relevant Orders    Home sleep apnea test (HSAT)    Nexplanon removal        Relevant Orders    Referral to Gynecology    Rash        Relevant Medications    fluocinonide (Lidex) 0.05 % cream            ROS otherwise negative aside from what was mentioned above in HPI.    Vitals  /77   Pulse 100   Temp 36.7 °C (98 °F)   Ht 1.575 m (5' 2\")   Wt 114 kg (251 lb 8 oz)   SpO2 96%   BMI 46.00 kg/m²   Body mass index is 46 kg/m².  Physical Exam  Gen: Alert, NAD  HEENT:  Normal exam  Neck:  No masses/nodes palpable; Thyroid nontender and without nodules; No ADOLFO  Respiratory:  Lungs CTAB  CV: RRR  Neuro:  Gross motor and sensory intact  Skin:  No suspicious lesions present  Breast: No masses or axillary lymphadenopathy      Allergies and Medications  Apple, Chickpea, Dog dander, Pine nut, Sesame seed, and Tree nuts  Current Outpatient Medications   Medication Instructions    adapalene-benzoyl peroxide (Epiduo) 0.1-2.5 % gel 1 Application, Topical, Nightly    albuterol 90 mcg/actuation inhaler INHALE 1 TO 2 PUFFS BY MOUTH EVERY 4 TO 6 HOURS AS NEEDED    cholecalciferol (VITAMIN D-3) 50 mcg, oral, " Daily, TAKE WITH FOOD    cyclobenzaprine (FLEXERIL) 10 mg, oral, 3 times daily PRN    EPINEPHrine 0.3 mg/0.3 mL injection syringe 0.3 mL, injection, As needed    etonogestrel-eluting contraceptive (Nexplanon) 68 mg implant implant subcutaneous    famotidine (PEPCID) 20 mg, oral, As needed    fluocinonide (Lidex) 0.05 % cream Topical, 2 times daily    gabapentin (Neurontin) 300 mg capsule Take 1 capsule (300 mg) by mouth 3 times a day, as needed    hydrOXYzine HCL (ATARAX) 25 mg, oral, As needed    lamoTRIgine (LAMICTAL) 100 mg, oral, Daily    ondansetron ODT (ZOFRAN-ODT) 4 mg, oral, Every 6 hours PRN    Symbicort 160-4.5 mcg/actuation inhaler 2 puffs, inhalation, 2 times daily    tirzepatide (weight loss) (ZEPBOUND) 2.5 mg, subcutaneous, Every 7 days    traZODone (DESYREL) 100 mg, oral, Nightly    venlafaxine XR (EFFEXOR-XR) 150 mg, oral, Daily, WITH FOOD @ 9AM WITH 75 MG CAP FOR DAILY DOSE 225 MG       Active Problem List  Patient Active Problem List   Diagnosis    ADD (attention deficit disorder)    Anxiety and depression    Asthma in adult, moderate persistent, uncomplicated ([facility])    Chronic insomnia    Obsessive compulsive personality disorder (Multi)    TMJ hypermobility    Vitamin D deficiency    Colitis    H/O eating disorder    History of posttraumatic stress disorder (PTSD)    Nexplanon in place    Routine health maintenance    Moderate obstructive sleep apnea    Acne vulgaris    H/O CT scan of brain    Class 3 severe obesity due to excess calories with body mass index (BMI) of 45.0 to 49.9 in adult (Multi)    H/O concussion    Cervical spinal stenosis    Cervical disc disease    Bipolar 2 disorder (Multi)       Comprehensive Medical/Surgical/Social/Family History  Past Medical History:   Diagnosis Date    H/O CT scan of abdomen 2022 11/19/22 - Findings suggestive of epiploic appendagitis along the mid descending colon. Borderline prominence of the appendix, without surrounding fat stranding.  Clinical correlation needed. Redemonstration of mildly asymmetrically enlarged left adnexa. Consider correlation with pelvic ultrasound if clinically indicated.    H/O CT scan of brain 2014: NORMAL BRAIN.  NO EVIDENCE OF AN ACUTE INTRACRANIAL PROCESS.    H/O magnetic resonance imaging of cervical spine 2023    Mild spinal canal stenosis at C5-C6 with minimal flattening of the left ventral spinal cord without spinal cord signal abnormality and with associated mild left neural foraminal stenosis    H/O pulmonary function tests     History of cervical spine x-ray 2022: mild straightening of the cervical spine which is likely  positional : normal    History of magnetic resonance imaging of head 2022:  MRI TMJ: 1. Negative study without evidence of disease involving bilateral temporomandibular joints.     Past Surgical History:   Procedure Laterality Date    INSERTION OF CONTRACEPTIVE CAPSULE  2021    Nexplanon insertion: 21    NASAL SEPTUM SURGERY  2021    RHINOPLASTY  2021       Social History     Social History Narrative    Social History    Single, no kids    Has boyfriend    Works at [employer]     Nonsmoker    No ETOH    ---    Family History:    M:Mental Health Issues (Bryanna Mares)    F: Mental Health Issues, ETOHism    S:    PUncle; DM    MGM: Lymphoma, CAD    PGM: Lymphoma, Lung Issues ()

## 2024-07-11 NOTE — ASSESSMENT & PLAN NOTE
Annual Wellness exam completed   Preventive Health History reviewed  Ordered:   Labs    PAP due, will see GYN to have Nexplanon removed and do it then

## 2024-07-12 ENCOUNTER — PHARMACY VISIT (OUTPATIENT)
Dept: PHARMACY | Facility: CLINIC | Age: 24
End: 2024-07-12
Payer: COMMERCIAL

## 2024-07-12 ENCOUNTER — TELEPHONE (OUTPATIENT)
Dept: PRIMARY CARE | Facility: CLINIC | Age: 24
End: 2024-07-12
Payer: COMMERCIAL

## 2024-07-12 NOTE — TELEPHONE ENCOUNTER
----- Message from Galina Deras sent at 7/12/2024  9:36 AM EDT -----  relay  ----- Message -----  From: Angelic Barba PharmD  Sent: 7/12/2024   9:30 AM EDT  To: Ioana Figueroa, PharmD; Galina Deras MD    Looks like it is ready to be picked up at [facility]!  ----- Message -----  From: Galina Deras MD  Sent: 7/11/2024  10:27 AM EDT  To: Angelic Barba, PharmD; Ioana Figueroa, PharmD    Can you help her get zepbound please? She cannot find it anywhere  Trying [facility]

## 2024-08-02 ENCOUNTER — E-VISIT (OUTPATIENT)
Dept: PRIMARY CARE | Facility: CLINIC | Age: 24
End: 2024-08-02
Payer: COMMERCIAL

## 2024-08-02 DIAGNOSIS — E66.01 CLASS 3 SEVERE OBESITY DUE TO EXCESS CALORIES WITH SERIOUS COMORBIDITY AND BODY MASS INDEX (BMI) OF 45.0 TO 49.9 IN ADULT (MULTI): ICD-10-CM

## 2024-08-02 NOTE — TELEPHONE ENCOUNTER
"Patient calling for refill on their weight loss medication     Height: 5'2\"  Current weight: 240.6 lbs  Previous weight: 251 lbs  Current med/dose: 2.5 mg/0.5 mL  Last weight loss appt: 07/11/2024  On current dose at least 1 month prior to requesting dose increase:  Tolerating without AE/SE? : a little constipated but better  [facility]: [facility]    Updated med list   Formatted refill request    "

## 2024-08-05 DIAGNOSIS — F31.81 BIPOLAR 2 DISORDER (MULTI): ICD-10-CM

## 2024-08-05 RX ORDER — LAMOTRIGINE 100 MG/1
TABLET ORAL DAILY
Qty: 90 TABLET | Refills: 3 | Status: SHIPPED | OUTPATIENT
Start: 2024-08-05

## 2024-09-06 DIAGNOSIS — E66.01 CLASS 3 SEVERE OBESITY DUE TO EXCESS CALORIES WITH SERIOUS COMORBIDITY AND BODY MASS INDEX (BMI) OF 45.0 TO 49.9 IN ADULT (MULTI): ICD-10-CM

## 2024-09-06 PROCEDURE — RXMED WILLOW AMBULATORY MEDICATION CHARGE

## 2024-09-07 ENCOUNTER — PHARMACY VISIT (OUTPATIENT)
Dept: PHARMACY | Facility: CLINIC | Age: 24
End: 2024-09-07
Payer: COMMERCIAL

## 2024-10-01 DIAGNOSIS — F31.81 BIPOLAR 2 DISORDER (MULTI): ICD-10-CM

## 2024-10-01 DIAGNOSIS — E66.01 CLASS 3 SEVERE OBESITY DUE TO EXCESS CALORIES WITH SERIOUS COMORBIDITY AND BODY MASS INDEX (BMI) OF 45.0 TO 49.9 IN ADULT: ICD-10-CM

## 2024-10-01 DIAGNOSIS — E66.813 CLASS 3 SEVERE OBESITY DUE TO EXCESS CALORIES WITH SERIOUS COMORBIDITY AND BODY MASS INDEX (BMI) OF 45.0 TO 49.9 IN ADULT: ICD-10-CM

## 2024-10-03 PROCEDURE — RXMED WILLOW AMBULATORY MEDICATION CHARGE

## 2024-10-03 RX ORDER — LAMOTRIGINE 100 MG/1
150 TABLET ORAL DAILY
Qty: 90 TABLET | Refills: 3 | Status: SHIPPED | OUTPATIENT
Start: 2024-10-03

## 2024-10-04 PROCEDURE — RXMED WILLOW AMBULATORY MEDICATION CHARGE

## 2024-10-07 ENCOUNTER — PHARMACY VISIT (OUTPATIENT)
Dept: PHARMACY | Facility: CLINIC | Age: 24
End: 2024-10-07
Payer: COMMERCIAL

## 2024-10-30 DIAGNOSIS — M48.02 CERVICAL SPINAL STENOSIS: Primary | ICD-10-CM

## 2024-10-30 NOTE — PROGRESS NOTES
"Pt called for refill on zepbound would like to increase dose from 10 to 12.5   Tried calling went to voice mail and mailbox is full need to get current weight and info below  Pt also requested a referral for pt for chronic neck and shoulder pain want to go to [facility] in [place] referral is done     Height: 5' 2\"  Weight:  Current med/dose:10 mg  On current dose at least 1 month prior to requesting dose increase:  Tolerating without AE/SE?   Pharmacy:   "

## 2024-10-31 DIAGNOSIS — E66.01 CLASS 3 SEVERE OBESITY WITH BODY MASS INDEX (BMI) OF 45.0 TO 49.9 IN ADULT, UNSPECIFIED OBESITY TYPE, UNSPECIFIED WHETHER SERIOUS COMORBIDITY PRESENT: ICD-10-CM

## 2024-10-31 DIAGNOSIS — E66.813 CLASS 3 SEVERE OBESITY WITH BODY MASS INDEX (BMI) OF 45.0 TO 49.9 IN ADULT, UNSPECIFIED OBESITY TYPE, UNSPECIFIED WHETHER SERIOUS COMORBIDITY PRESENT: ICD-10-CM

## 2024-10-31 PROCEDURE — RXMED WILLOW AMBULATORY MEDICATION CHARGE

## 2024-10-31 RX ORDER — TIRZEPATIDE 12.5 MG/.5ML
12.5 INJECTION, SOLUTION SUBCUTANEOUS WEEKLY
Qty: 2 ML | Refills: 2 | Status: SHIPPED | OUTPATIENT
Start: 2024-10-31

## 2024-10-31 NOTE — PROGRESS NOTES
Patient calling for refill on their weight loss medication     Height:  Current weight: 198 lbs  Previous weight: 211 lbs  Current med/dose: 10 mg  Last weight loss appt: 07/11/2024  On current dose at least 1 month prior to requesting dose increase: yes, increase 12.5 mg   Tolerating without AE/SE? : no side effects  [facility]: [facility]    Updated med list   Formatted refill request

## 2024-11-04 ENCOUNTER — PHARMACY VISIT (OUTPATIENT)
Dept: PHARMACY | Facility: CLINIC | Age: 24
End: 2024-11-04
Payer: COMMERCIAL

## 2024-11-27 DIAGNOSIS — E66.01 CLASS 3 SEVERE OBESITY WITH BODY MASS INDEX (BMI) OF 45.0 TO 49.9 IN ADULT, UNSPECIFIED OBESITY TYPE, UNSPECIFIED WHETHER SERIOUS COMORBIDITY PRESENT: ICD-10-CM

## 2024-11-27 DIAGNOSIS — E66.813 CLASS 3 SEVERE OBESITY WITH BODY MASS INDEX (BMI) OF 45.0 TO 49.9 IN ADULT, UNSPECIFIED OBESITY TYPE, UNSPECIFIED WHETHER SERIOUS COMORBIDITY PRESENT: ICD-10-CM

## 2024-11-27 PROCEDURE — RXMED WILLOW AMBULATORY MEDICATION CHARGE

## 2024-11-30 ENCOUNTER — PHARMACY VISIT (OUTPATIENT)
Dept: PHARMACY | Facility: CLINIC | Age: 24
End: 2024-11-30
Payer: COMMERCIAL

## 2024-12-23 PROCEDURE — RXMED WILLOW AMBULATORY MEDICATION CHARGE

## 2024-12-28 ENCOUNTER — PHARMACY VISIT (OUTPATIENT)
Dept: PHARMACY | Facility: CLINIC | Age: 24
End: 2024-12-28
Payer: COMMERCIAL

## 2025-01-08 ENCOUNTER — OFFICE VISIT (OUTPATIENT)
Dept: URGENT CARE | Age: 25
End: 2025-01-08
Payer: COMMERCIAL

## 2025-01-08 VITALS
BODY MASS INDEX: 36.25 KG/M2 | TEMPERATURE: 97.7 F | DIASTOLIC BLOOD PRESSURE: 89 MMHG | HEART RATE: 104 BPM | HEIGHT: 61 IN | WEIGHT: 192 LBS | SYSTOLIC BLOOD PRESSURE: 116 MMHG | OXYGEN SATURATION: 98 % | RESPIRATION RATE: 16 BRPM

## 2025-01-08 DIAGNOSIS — R11.11 VOMITING WITHOUT NAUSEA, UNSPECIFIED VOMITING TYPE: ICD-10-CM

## 2025-01-08 DIAGNOSIS — R19.7 DIARRHEA OF PRESUMED INFECTIOUS ORIGIN: Primary | ICD-10-CM

## 2025-01-08 LAB
POC RAPID INFLUENZA A: NEGATIVE
POC RAPID INFLUENZA B: NEGATIVE
POC SARS-COV-2 AG BINAX: NORMAL

## 2025-01-08 PROCEDURE — 99203 OFFICE O/P NEW LOW 30 MIN: CPT | Performed by: STUDENT IN AN ORGANIZED HEALTH CARE EDUCATION/TRAINING PROGRAM

## 2025-01-08 PROCEDURE — 1036F TOBACCO NON-USER: CPT | Performed by: STUDENT IN AN ORGANIZED HEALTH CARE EDUCATION/TRAINING PROGRAM

## 2025-01-08 PROCEDURE — 3008F BODY MASS INDEX DOCD: CPT | Performed by: STUDENT IN AN ORGANIZED HEALTH CARE EDUCATION/TRAINING PROGRAM

## 2025-01-08 PROCEDURE — 87811 SARS-COV-2 COVID19 W/OPTIC: CPT | Performed by: STUDENT IN AN ORGANIZED HEALTH CARE EDUCATION/TRAINING PROGRAM

## 2025-01-08 PROCEDURE — 87804 INFLUENZA ASSAY W/OPTIC: CPT | Performed by: STUDENT IN AN ORGANIZED HEALTH CARE EDUCATION/TRAINING PROGRAM

## 2025-01-08 RX ORDER — ONDANSETRON 4 MG/1
4 TABLET, ORALLY DISINTEGRATING ORAL EVERY 8 HOURS PRN
Qty: 9 TABLET | Refills: 0 | Status: SHIPPED | OUTPATIENT
Start: 2025-01-08 | End: 2025-01-11

## 2025-01-08 NOTE — PROGRESS NOTES
"Subjective   Patient ID: Aisha Mares is a 24 y.o. female. They present today with a chief complaint of Vomiting and Diarrhea (Patient states she has been having some GI issues/Flu like symptoms. Nausea and vomiting that started around 3 am yesterday.).    History of Present Illness  Patient reports symptoms present since 3am yesterday  Endorses numerous episodes of watery diarrhea  No reported blood in the stool  8+ episodes of vomiting  No reported fever  Difficult to keep fluids down  Denies smoking  Taking effexor  Denies dizziness, chest pain, palpitations  Wonders if her symptoms could be from her weight loss medication, uses once a week. Last used 3-4 days ago  Denies hx of DM  Works in a school setting        Past Medical History  Allergies as of 01/08/2025 - Reviewed 01/08/2025   Allergen Reaction Noted    Apple Unknown 01/18/2023    Chickpea Swelling 04/05/2017    Dog dander Other 01/18/2023    Pine nut Swelling 04/05/2017    Sesame seed Unknown 01/18/2023    Tree nuts Unknown 07/11/2024       (Not in a hospital admission)                reports that she has never smoked. She has never used smokeless tobacco. She reports that she does not currently use alcohol. She reports that she does not use drugs.                               Objective    Vitals:    01/08/25 1544   BP: 116/89   Pulse: 104   Resp: 16   Temp: 36.5 °C (97.7 °F)   TempSrc: Oral   SpO2: 98%   Weight: 87.1 kg (192 lb)   Height: 1.549 m (5' 1\")     Patient's last menstrual period was 12/23/2024 (approximate).    Physical Exam  Chaperone present: Boyfriend present.   Constitutional:       General: She is not in acute distress.     Appearance: Normal appearance. She is not toxic-appearing or diaphoretic.   HENT:      Nose: No rhinorrhea.   Eyes:      General: No scleral icterus.        Right eye: No discharge.         Left eye: No discharge.      Extraocular Movements: Extraocular movements intact.   Pulmonary:      Effort: Pulmonary effort is " normal.   Abdominal:      General: There is no distension.      Palpations: Abdomen is soft.      Tenderness: There is no abdominal tenderness. There is no guarding.   Musculoskeletal:      Cervical back: Normal range of motion.   Neurological:      Mental Status: She is alert.   Psychiatric:         Mood and Affect: Mood normal.         Behavior: Behavior normal.         Thought Content: Thought content normal.      Comments: Pleasant         Procedures    Point of Care Test & Imaging Results from this visit:      Diagnostic study results (if any) were reviewed by [facility].    Assessment/Plan   Allergies, medications, history, and pertinent labs/EKGs/Imaging reviewed by Jim Palomares MD.     Medical Decision Making:    Patient's acute ~36 hours of symptoms are likely 2/2 viral gastroenteritis vs less likely bacterial gastroenteritis vs food poisoning, or other etiology such as medication side-effect. Rapid COVID and FLU neg. Afebrile, HDS. Encourage increased fluid intake. May trial BRT diet and ADAT. Avoid loperamide if able. Through shared decision making, will order stool pathogen PCR to be completed at a local lab. Supportive care with zofran PRN. Follow up if symptoms fail to improve. ER if symptoms worsen    Orders and Diagnoses  Diagnoses and all orders for this visit:  Diarrhea of presumed infectious origin  -     POCT Covid-19 Rapid Antigen  -     POCT Influenza A/B manually resulted  -     Stool Pathogen Panel, PCR; Future  Vomiting without nausea, unspecified vomiting type  -     POCT Covid-19 Rapid Antigen  -     POCT Influenza A/B manually resulted  -     ondansetron ODT (Zofran-ODT) 4 mg disintegrating tablet; Dissolve 1 tablet (4 mg) in the mouth every 8 hours if needed for nausea or vomiting for up to 3 days.      Patient disposition: Home      Medical Admin Record      Follow Up Instructions  No follow-ups on file.    Electronically signed by [facility]  4:09 PM

## 2025-01-08 NOTE — LETTER
January 8, 2025     Patient: Aisha Mares   YOB: 2000   Date of Visit: 1/8/2025       To Whom It May Concern:    Aisha Mares was seen in my clinic on 1/8/2025 at 3:30 pm. Please excuse Aisha for her absence from work on this day to make the appointment. She is able to return 1/13/2025.    If you have any questions or concerns, please don't hesitate to call.         Sincerely,         [facility]        CC: No Recipients

## 2025-01-08 NOTE — PATIENT INSTRUCTIONS
Your symptoms are likely secondary to viral gastroenteritis   Complete your stool testing at a local  lab  Please drink plenty of fluids  Every time you vomit or defecate please drink afterwards  Liquid IV, Gatorade, Pedialyte, water  Please follow up with your PCP regarding your weight loss medication  ER if symptoms worsen

## 2025-01-10 ENCOUNTER — TELEPHONE (OUTPATIENT)
Dept: PRIMARY CARE | Facility: CLINIC | Age: 25
End: 2025-01-10
Payer: COMMERCIAL

## 2025-01-10 DIAGNOSIS — E66.813 CLASS 3 SEVERE OBESITY WITH BODY MASS INDEX (BMI) OF 45.0 TO 49.9 IN ADULT, UNSPECIFIED OBESITY TYPE, UNSPECIFIED WHETHER SERIOUS COMORBIDITY PRESENT: ICD-10-CM

## 2025-01-10 DIAGNOSIS — E66.01 CLASS 3 SEVERE OBESITY WITH BODY MASS INDEX (BMI) OF 45.0 TO 49.9 IN ADULT, UNSPECIFIED OBESITY TYPE, UNSPECIFIED WHETHER SERIOUS COMORBIDITY PRESENT: ICD-10-CM

## 2025-01-10 NOTE — TELEPHONE ENCOUNTER
Pt called would like to talk about easing off zepbound over last 2 months having GI side effects she has lost 60 lbs she is currently on the 10 mg dose weekly not sure if she wants to stay on this level or options of decreasing dose. Her insurance is changing in march not sure if it will be covered.  Please schedule appt not urgent

## 2025-01-10 NOTE — TELEPHONE ENCOUNTER
Patient scheduled. She is wanting to know if she can take a lower dose of the Zepbound in the mean time?

## 2025-01-11 PROCEDURE — RXMED WILLOW AMBULATORY MEDICATION CHARGE

## 2025-01-27 ENCOUNTER — PHARMACY VISIT (OUTPATIENT)
Dept: PHARMACY | Facility: CLINIC | Age: 25
End: 2025-01-27
Payer: COMMERCIAL

## 2025-01-28 NOTE — PROGRESS NOTES
An interactive audio/visual  telecommunication system which permits real time communications between the patient (at the originating site) and provider (at the distant site) was utilized to provide this telehealth service.    Verbal consent was requested and obtained from the patient for this telehealth visit.  We have confirmed the patient wishes to see me, Sasha Chandler, a board certified nurse practitioner with an active [place] advanced practice license as well as verified the patient's identity and physical location in [place].      Problem List Items Addressed This Visit          Medium    Class 2 severe obesity due to excess calories with serious comorbidity and body mass index (BMI) of 35.0 to 35.9 in adult    Overview     No Family hx MEN/MTC  NO hx pancreatitis  Date Weight   (lbs)   7/11/24 251 lb 8 oz    1/8/25 192 lb   1/29/25 189 lb    Target goal 5% to 10% weight loss within 3-6 months  Continue to focus on lifestyle modifications as discussed.  1/29/25: begin wean zepbound (insurance changes March 2025). Ok to send 2.5 mg dose just prior to insurance change         Relevant Medications    tirzepatide, weight loss, (Zepbound) 5 mg/0.5 mL injection    H/O eating disorder    Overview     Anorexia, mild bulimia  Was Enrolled in the [employer] and put on Wellbutrin  prn Pepcid  s/p Admission to the [facility] 3/22 (excellent per patient)  Angelic Stark 2x a week         Relevant Medications    tirzepatide, weight loss, (Zepbound) 5 mg/0.5 mL injection     Other Visit Diagnoses       Encounter for weight management    -  Primary    Relevant Medications    tirzepatide, weight loss, (Zepbound) 5 mg/0.5 mL injection             discuss zepbound has been on it since July losing weight still having nausea even with decreasing dose her insurance changes in march and would like to transition off med and stay off Most recent weight was 189 lb last week has not checked it this week   7.5 mg  "dose x 2 weeks  Insurance changing end of Feb  Doesn't anticipate coverage  Would like to wean     Weight Loss Follow Up    Subjective   HPI  Here for assessment of medications for weight loss. Pertinent PMH includes  OCPD, h/o eating disorder, bipolar, anxiety/depression, ADD .   At last visit zepbound initiated 7/2025     Weight Loss Pharmacotherapy   zepbound   Previous Weight Loss Pharmacotherapy    NA      Lifestyle:  Diet:  in general, a \"healthy\" diet    - Breakfast: breakfast sandwich, protein bar, protein shake  - Lunch: sandwich, salad  - Dinner: fresh prepared foods, rice bowls  - Snacks: not much   - Drinks: lots of water, celsius x 1   Lots of behavioral changes  - more variety in her choices  - fruits & veggies  - eating throughout the day    Physical Activity: yes coaching (leeanne)    Other Potential Contributing Factors  Alcohol use: Average 0 drinks/week  Medications:  effexor, lamictal, neurontin  Mental health: No current concerns  Comorbidities: OCPD, h/o eating disorder, bipolar, anxiety/depression, ADD  Sleep/energy needs to complete HST    Non-pharmacological therapy  Weight loss techniques attempted:   Self-directed dieting:      Reported Weight:  Baseline: 251 lb 8 oz    Current: 189 lb home scale   Patient reported goal: doesn't have a #  Last BMI: Body mass index is 35.71 kg/m².  Date Weight   (lbs)   7/11/24 251 lb 8 oz    1/8/25 192 lb   1/29/25 189 lb      Wt Readings from Last 3 Encounters:   01/29/25 85.7 kg (189 lb)   01/08/25 87.1 kg (192 lb)   07/11/24 114 kg (251 lb 8 oz)       Adverse Effects: any SE are manageable   Affordability/Accessibility:   Insurance coverage of weight-loss medications? Yes, now with mom's insurance  Eligible for copay cards/programs?       Objective   Gen: Alert, NAD  HEENT:  PERRLA, EOMI, conjunctiva and sclera normal in appearance; Neck supple  Respiratory:  Lungs CTAB  Cardiovascular:  Heart RRR. No M/R/G  Abdomen: soft, BS X 4  Neuro:  Gross " motor and sensory intact  Skin:  No suspicious lesions present   Mood: normal      Pertinent PMH Review (GLP-1ra):  - PMH of Pancreatitis: No  - PMH of Medullary Thyroid Cancer (MTC): No  - PMH of Multiple Endocrine Neoplasia (MEN) Type II: No    Lab Review  Lab Results   Component Value Date    HGBA1C 5.3 06/19/2023    GLUCOSE 84 09/26/2023   Hyperglycemia: Denies signs and symptoms  Lab Results   Component Value Date    GLUCOSE 84 09/26/2023    CALCIUM 9.2 09/26/2023     09/26/2023    K 4.3 09/26/2023    CO2 30 09/26/2023     09/26/2023    BUN 10 09/26/2023    CREATININE 0.94 09/26/2023       Drug Interactions: No significant drug-drug interactions exist that require change in therapy.    Labs ordered:     Follow Up: prn  PCP Follow Up: 7/22/25

## 2025-01-29 ENCOUNTER — APPOINTMENT (OUTPATIENT)
Dept: PRIMARY CARE | Facility: CLINIC | Age: 25
End: 2025-01-29
Payer: COMMERCIAL

## 2025-01-29 VITALS — BODY MASS INDEX: 35.68 KG/M2 | HEIGHT: 61 IN | WEIGHT: 189 LBS

## 2025-01-29 DIAGNOSIS — Z86.59 H/O EATING DISORDER: ICD-10-CM

## 2025-01-29 DIAGNOSIS — E66.01 CLASS 2 SEVERE OBESITY DUE TO EXCESS CALORIES WITH SERIOUS COMORBIDITY AND BODY MASS INDEX (BMI) OF 35.0 TO 35.9 IN ADULT: ICD-10-CM

## 2025-01-29 DIAGNOSIS — Z76.89 ENCOUNTER FOR WEIGHT MANAGEMENT: Primary | ICD-10-CM

## 2025-01-29 DIAGNOSIS — E66.812 CLASS 2 SEVERE OBESITY DUE TO EXCESS CALORIES WITH SERIOUS COMORBIDITY AND BODY MASS INDEX (BMI) OF 35.0 TO 35.9 IN ADULT: ICD-10-CM

## 2025-01-29 PROBLEM — E66.09 CLASS 2 OBESITY DUE TO EXCESS CALORIES WITHOUT SERIOUS COMORBIDITY WITH BODY MASS INDEX (BMI) OF 35.0 TO 35.9 IN ADULT: Status: ACTIVE | Noted: 2023-05-27

## 2025-01-29 PROCEDURE — 99213 OFFICE O/P EST LOW 20 MIN: CPT | Performed by: NURSE PRACTITIONER

## 2025-01-29 PROCEDURE — RXMED WILLOW AMBULATORY MEDICATION CHARGE

## 2025-01-29 PROCEDURE — 3008F BODY MASS INDEX DOCD: CPT | Performed by: NURSE PRACTITIONER

## 2025-02-13 ENCOUNTER — PHARMACY VISIT (OUTPATIENT)
Dept: PHARMACY | Facility: CLINIC | Age: 25
End: 2025-02-13
Payer: COMMERCIAL

## 2025-02-28 ENCOUNTER — PHARMACY VISIT (OUTPATIENT)
Dept: PHARMACY | Facility: CLINIC | Age: 25
End: 2025-02-28
Payer: COMMERCIAL

## 2025-02-28 VITALS — BODY MASS INDEX: 33.25 KG/M2 | WEIGHT: 176 LBS

## 2025-02-28 DIAGNOSIS — F51.04 CHRONIC INSOMNIA: ICD-10-CM

## 2025-02-28 DIAGNOSIS — E66.813 CLASS 3 SEVERE OBESITY WITH BODY MASS INDEX (BMI) OF 45.0 TO 49.9 IN ADULT, UNSPECIFIED OBESITY TYPE, UNSPECIFIED WHETHER SERIOUS COMORBIDITY PRESENT: ICD-10-CM

## 2025-02-28 DIAGNOSIS — E66.01 CLASS 3 SEVERE OBESITY WITH BODY MASS INDEX (BMI) OF 45.0 TO 49.9 IN ADULT, UNSPECIFIED OBESITY TYPE, UNSPECIFIED WHETHER SERIOUS COMORBIDITY PRESENT: ICD-10-CM

## 2025-02-28 PROCEDURE — RXMED WILLOW AMBULATORY MEDICATION CHARGE

## 2025-02-28 RX ORDER — HYDROXYZINE HYDROCHLORIDE 25 MG/1
TABLET, FILM COATED ORAL
Qty: 180 TABLET | Refills: 0 | Status: SHIPPED | OUTPATIENT
Start: 2025-02-28

## 2025-02-28 NOTE — TELEPHONE ENCOUNTER
Height: 5'1  Current weight: 176  Previous weight: 189  Current med/dose: Zepbound 5 ML   last weight loss appt: Jan 29  current dose at least 1 month prior: yes   tolerating without side effects: yes  [facility]     Tapering down to 2.5

## 2025-02-28 NOTE — TELEPHONE ENCOUNTER
"Refill Request      Last OV with PCP 7/11/2024     Future Appointments       Date / Time Provider Department Dept Phone    7/22/2025 10:00 AM (Arrive by 9:45 AM) Galina Deras MD [facility] [phone]          Lab Results   Component Value Date    HGBA1C 5.3 06/19/2023     Lab Results   Component Value Date    CHOL 134 11/16/2022    CHOL 148 11/03/2021     Lab Results   Component Value Date    HDL 58.0 11/16/2022    HDL 57.0 11/03/2021     No results found for: \"LDLCALC\"  Lab Results   Component Value Date    TRIG 74 11/16/2022    TRIG 88 11/03/2021     No components found for: \"CHOLHDL\"  Lab Results   Component Value Date    TSH 1.68 06/19/2023     Lab Results   Component Value Date    GLUCOSE 84 09/26/2023    CALCIUM 9.2 09/26/2023     09/26/2023    K 4.3 09/26/2023    CO2 30 09/26/2023     09/26/2023    BUN 10 09/26/2023    CREATININE 0.94 09/26/2023       "

## 2025-03-30 DIAGNOSIS — F32.A ANXIETY AND DEPRESSION: ICD-10-CM

## 2025-03-30 DIAGNOSIS — F41.9 ANXIETY AND DEPRESSION: ICD-10-CM

## 2025-03-30 RX ORDER — VENLAFAXINE HYDROCHLORIDE 150 MG/1
150 CAPSULE, EXTENDED RELEASE ORAL DAILY
Qty: 90 CAPSULE | Refills: 3 | Status: SHIPPED | OUTPATIENT
Start: 2025-03-30

## 2025-07-08 ENCOUNTER — OFFICE VISIT (OUTPATIENT)
Dept: URGENT CARE | Age: 25
End: 2025-07-08
Payer: COMMERCIAL

## 2025-07-08 VITALS
WEIGHT: 204 LBS | HEIGHT: 61 IN | OXYGEN SATURATION: 96 % | RESPIRATION RATE: 20 BRPM | HEART RATE: 83 BPM | BODY MASS INDEX: 38.51 KG/M2 | TEMPERATURE: 98.4 F | SYSTOLIC BLOOD PRESSURE: 107 MMHG | DIASTOLIC BLOOD PRESSURE: 74 MMHG

## 2025-07-08 DIAGNOSIS — R35.0 URINARY FREQUENCY: ICD-10-CM

## 2025-07-08 DIAGNOSIS — N39.0 URINARY TRACT INFECTION WITH HEMATURIA, SITE UNSPECIFIED: Primary | ICD-10-CM

## 2025-07-08 DIAGNOSIS — R30.0 BURNING WITH URINATION: ICD-10-CM

## 2025-07-08 DIAGNOSIS — R31.9 URINARY TRACT INFECTION WITH HEMATURIA, SITE UNSPECIFIED: Primary | ICD-10-CM

## 2025-07-08 LAB
POC APPEARANCE, URINE: ABNORMAL
POC BILIRUBIN, URINE: ABNORMAL
POC BLOOD, URINE: ABNORMAL
POC COLOR, URINE: ABNORMAL
POC GLUCOSE, URINE: ABNORMAL MG/DL
POC KETONES, URINE: ABNORMAL MG/DL
POC LEUKOCYTES, URINE: ABNORMAL
POC NITRITE,URINE: POSITIVE
POC PH, URINE: 5 PH
POC PROTEIN, URINE: ABNORMAL MG/DL
POC SPECIFIC GRAVITY, URINE: 1.02
POC UROBILINOGEN, URINE: >=8 EU/DL
PREGNANCY TEST URINE, POC: NEGATIVE

## 2025-07-08 RX ORDER — NITROFURANTOIN 25; 75 MG/1; MG/1
100 CAPSULE ORAL 2 TIMES DAILY
Qty: 14 CAPSULE | Refills: 0 | Status: SHIPPED | OUTPATIENT
Start: 2025-07-08 | End: 2025-07-15

## 2025-07-08 NOTE — PROGRESS NOTES
"Subjective   Patient ID: Aisha Mares is a 25 y.o. female. They present today with a chief complaint of Other (Possible UTI. Burning and painful urination. Cramping./).    History of Present Illness  Aisha is a 25-year-old female who presents to the urgent care for several days of painful urination, burning and frequency.  Patient notes feeling feverish without a recorded temperature and denies vomiting or severe abdominal pain otherwise.  Patient is seeking evaluation reassurance and admits to taken Azo 20 minutes prior to visit.  Patient is seeking evaluation reassurance for suspected UTI as she has had this before that \"she ended up in the hospital with\".  Patient denies concern for STIs.  Patient denies vaginal lesions, rashes or sores or abnormal vaginal discharge. Patient seeking evaluation reassurance, no other symptoms or concerns otherwise reported.     Past Medical History  Allergies as of 07/08/2025 - Reviewed 07/08/2025   Allergen Reaction Noted    Apple Unknown 01/18/2023    Chickpea Swelling 04/05/2017    Dog dander Other 01/18/2023    Pine nut Swelling 04/05/2017    Sesame seed Unknown 01/18/2023    Tree nuts Unknown 07/11/2024       Prescriptions Prior to Admission[1]     Medical History[2]    Surgical History[3]     reports that she has never smoked. She has never used smokeless tobacco. She reports that she does not currently use alcohol. She reports that she does not use drugs.    Review of Systems  A 10-point review of systems was performed, otherwise unremarkable unless stated in the history of present illness.                Objective    Vitals:    07/08/25 1416   BP: 107/74   Pulse: 83   Resp: 20   Temp: 36.9 °C (98.4 °F)   TempSrc: Oral   SpO2: 96%   Weight: 92.5 kg (204 lb)   Height: (!) 1.549 m (5' 1\")     No LMP recorded.    Gen: Vitals noted and reviewed, no evidence of acute distress, well developed and afebrile.   Psych: Mood and affect appropriate for setting.  Head/Face: Atraumatic and " normocephalic.   Neuro: No focal deficits noted.  Cardiac: Regular rate and rhythm no murmur.   Lungs: Clear to auscultation throughout, No evidence of wheezing, rhonchi or crackles. Good aeration throughout.   Abdomen: Soft, non-tender throughout. Normoactive bowel sounds. No evidence of palpable masses. No CVA tenderness noted.    (female): Deferred by the patient     Extremities: Symmetrical, No peripheral edema  Skin: Without evidence of ecchymosis, wounds, or rashes.      Point of Care Test & Imaging Results from this visit  Results for orders placed or performed in visit on 07/08/25   POCT UA Automated manually resulted   Result Value Ref Range    POC Color, Urine Orange (A) Straw, Yellow, Light-Yellow    POC Appearance, Urine Cloudy (A) Clear    POC Glucose, Urine 100 (1+) (A) NEGATIVE mg/dl    POC Bilirubin, Urine LARGE (3+) (A) NEGATIVE    POC Ketones, Urine TRACE (A) NEGATIVE mg/dl    POC Specific Gravity, Urine 1.020 1.005 - 1.035    POC Blood, Urine LARGE (3+) (A) NEGATIVE    POC PH, Urine 5.0 No Reference Range Established PH    POC Protein, Urine 30 (1+) (A) NEGATIVE mg/dl    POC Urobilinogen, Urine >=8.0 (A) 0.2, 1.0 EU/DL    Poc Nitrite, Urine POSITIVE (A) NEGATIVE    POC Leukocytes, Urine LARGE (3+) (A) NEGATIVE   POCT pregnancy, urine manually resulted   Result Value Ref Range    Preg Test, Ur Negative Negative      Imaging  No results found.    Cardiology, Vascular, and Other Imaging  No other imaging results found for the past 2 days      Diagnostic study results (if any) were reviewed by Jered Diaz DO.    Assessment/Plan   Allergies, medications, history, and pertinent labs/EKGs/Imaging reviewed by Jered Diaz DO.     Medical Decision Making  Discussed with the patient symptoms and clinical presentation findings of burning with urination and frequency may be secondary to urinary tract infection of unclear etiology with urine dip altered after Azo use without definitive results otherwise  identified in clinic.  However we did recommend supportive treatment measures and adequate hydration and given the patient's symptomatic state additional treatment such as antimicrobial coverage can be considered.  In the meantime we sent urine out for culture and sensitivities and will follow-up on results and adjust treatment accordingly.  We also reviewed patient allergies medications are prescribed Macrobid for treatment. Follow up with Primary Care Provider. We advised seeking immediate emergency medical attention if symptoms fail to improve, worsen or any concerning symptoms arise. Patient voiced full understanding and agreement to plan.    We discussed with the patient our clinical thoughts at this time given the above findings and clinical assessment and we had a shared decision-making conversation in a patient-centered decision-making model on how to proceed forward. The patient was instructed on the importance of a close follow-up with Primary Care Provider and other care providers. The patient was also advised that an Urgent care diagnosis is often a preliminary impression and that definitive care is often not able to be given completley in the Urgent care setting.      Orders and Diagnoses  Diagnoses and all orders for this visit:  Burning with urination  -     POCT UA Automated manually resulted  -     POCT pregnancy, urine manually resulted      Medical Admin Record      Patient disposition: Home    Electronically signed by Jered Diaz DO  2:26 PM           [1] (Not in a hospital admission)  [2]   Past Medical History:  Diagnosis Date    H/O CT scan of abdomen 2022 11/19/22 - Findings suggestive of epiploic appendagitis along the mid descending colon. Borderline prominence of the appendix, without surrounding fat stranding. Clinical correlation needed. Redemonstration of mildly asymmetrically enlarged left adnexa. Consider correlation with pelvic ultrasound if clinically indicated.    H/O CT scan  of brain 2014 2014: NORMAL BRAIN.  NO EVIDENCE OF AN ACUTE INTRACRANIAL PROCESS.    H/O magnetic resonance imaging of cervical spine 06/16/2023    Mild spinal canal stenosis at C5-C6 with minimal flattening of the left ventral spinal cord without spinal cord signal abnormality and with associated mild left neural foraminal stenosis    H/O pulmonary function tests     History of cervical spine x-ray 11/2022    2014: mild straightening of the cervical spine which is likely  positional 11/22: normal    History of magnetic resonance imaging of head 07/2022 7/22:  MRI TMJ: 1. Negative study without evidence of disease involving bilateral temporomandibular joints.   [3]   Past Surgical History:  Procedure Laterality Date    INSERTION OF CONTRACEPTIVE CAPSULE  08/22/2021    Nexplanon insertion: 8/17/21    NASAL SEPTUM SURGERY  09/23/2021    RHINOPLASTY  09/23/2021

## 2025-07-11 LAB — BACTERIA UR CULT: ABNORMAL

## 2025-07-22 ENCOUNTER — HOSPITAL ENCOUNTER (OUTPATIENT)
Dept: RADIOLOGY | Facility: CLINIC | Age: 25
Discharge: HOME | End: 2025-07-22
Payer: COMMERCIAL

## 2025-07-22 ENCOUNTER — APPOINTMENT (OUTPATIENT)
Dept: PRIMARY CARE | Facility: CLINIC | Age: 25
End: 2025-07-22
Payer: COMMERCIAL

## 2025-07-22 VITALS
TEMPERATURE: 98.6 F | DIASTOLIC BLOOD PRESSURE: 64 MMHG | OXYGEN SATURATION: 97 % | HEIGHT: 62 IN | HEART RATE: 83 BPM | BODY MASS INDEX: 38.13 KG/M2 | WEIGHT: 207.2 LBS | SYSTOLIC BLOOD PRESSURE: 92 MMHG

## 2025-07-22 DIAGNOSIS — F51.04 CHRONIC INSOMNIA: ICD-10-CM

## 2025-07-22 DIAGNOSIS — J45.40 ASTHMA IN ADULT, MODERATE PERSISTENT, UNCOMPLICATED (HHS-HCC): ICD-10-CM

## 2025-07-22 DIAGNOSIS — F90.2 ATTENTION DEFICIT HYPERACTIVITY DISORDER (ADHD), COMBINED TYPE: ICD-10-CM

## 2025-07-22 DIAGNOSIS — M48.02 CERVICAL SPINAL STENOSIS: ICD-10-CM

## 2025-07-22 DIAGNOSIS — F41.9 ANXIETY AND DEPRESSION: ICD-10-CM

## 2025-07-22 DIAGNOSIS — M50.90 CERVICAL DISC DISEASE: ICD-10-CM

## 2025-07-22 DIAGNOSIS — E66.01 CLASS 2 SEVERE OBESITY DUE TO EXCESS CALORIES WITH SERIOUS COMORBIDITY AND BODY MASS INDEX (BMI) OF 38.0 TO 38.9 IN ADULT: ICD-10-CM

## 2025-07-22 DIAGNOSIS — L70.0 ACNE VULGARIS: ICD-10-CM

## 2025-07-22 DIAGNOSIS — K21.9 GASTROESOPHAGEAL REFLUX DISEASE WITHOUT ESOPHAGITIS: ICD-10-CM

## 2025-07-22 DIAGNOSIS — E66.812 CLASS 2 SEVERE OBESITY DUE TO EXCESS CALORIES WITH SERIOUS COMORBIDITY AND BODY MASS INDEX (BMI) OF 38.0 TO 38.9 IN ADULT: ICD-10-CM

## 2025-07-22 DIAGNOSIS — Z79.899 MEDICATION MANAGEMENT: ICD-10-CM

## 2025-07-22 DIAGNOSIS — Z00.00 ROUTINE HEALTH MAINTENANCE: Primary | ICD-10-CM

## 2025-07-22 DIAGNOSIS — F32.A ANXIETY AND DEPRESSION: ICD-10-CM

## 2025-07-22 DIAGNOSIS — E55.9 VITAMIN D DEFICIENCY: ICD-10-CM

## 2025-07-22 DIAGNOSIS — F31.81 BIPOLAR 2 DISORDER (MULTI): ICD-10-CM

## 2025-07-22 PROBLEM — Z97.5 NEXPLANON IN PLACE: Status: RESOLVED | Noted: 2023-02-15 | Resolved: 2025-07-22

## 2025-07-22 PROCEDURE — 72050 X-RAY EXAM NECK SPINE 4/5VWS: CPT | Performed by: RADIOLOGY

## 2025-07-22 PROCEDURE — 1036F TOBACCO NON-USER: CPT | Performed by: INTERNAL MEDICINE

## 2025-07-22 PROCEDURE — 99395 PREV VISIT EST AGE 18-39: CPT | Performed by: INTERNAL MEDICINE

## 2025-07-22 PROCEDURE — 99214 OFFICE O/P EST MOD 30 MIN: CPT | Performed by: INTERNAL MEDICINE

## 2025-07-22 PROCEDURE — 3008F BODY MASS INDEX DOCD: CPT | Performed by: INTERNAL MEDICINE

## 2025-07-22 PROCEDURE — 72050 X-RAY EXAM NECK SPINE 4/5VWS: CPT

## 2025-07-22 RX ORDER — CYCLOBENZAPRINE HCL 10 MG
10 TABLET ORAL 3 TIMES DAILY PRN
Qty: 270 TABLET | Refills: 3 | Status: SHIPPED | OUTPATIENT
Start: 2025-07-22

## 2025-07-22 RX ORDER — TRETINOIN 0.5 MG/G
CREAM TOPICAL NIGHTLY
Qty: 45 G | Refills: 0 | Status: SHIPPED | OUTPATIENT
Start: 2025-07-22 | End: 2026-07-22

## 2025-07-22 RX ORDER — LAMOTRIGINE 100 MG/1
100 TABLET ORAL DAILY
Qty: 90 TABLET | Refills: 3 | Status: SHIPPED | OUTPATIENT
Start: 2025-07-22

## 2025-07-22 RX ORDER — ALBUTEROL SULFATE 90 UG/1
2-4 INHALANT RESPIRATORY (INHALATION) EVERY 6 HOURS PRN
Qty: 18 G | Refills: 3 | Status: SHIPPED | OUTPATIENT
Start: 2025-07-22

## 2025-07-22 RX ORDER — VENLAFAXINE HYDROCHLORIDE 150 MG/1
150 CAPSULE, EXTENDED RELEASE ORAL DAILY
Qty: 90 CAPSULE | Refills: 3 | Status: SHIPPED | OUTPATIENT
Start: 2025-07-22

## 2025-07-22 RX ORDER — BENZOYL PEROXIDE 5 G/100G
GEL TOPICAL 2 TIMES DAILY
Qty: 60 G | Refills: 2 | Status: SHIPPED | OUTPATIENT
Start: 2025-07-22 | End: 2025-11-19

## 2025-07-22 RX ORDER — BUDESONIDE AND FORMOTEROL FUMARATE DIHYDRATE 160; 4.5 UG/1; UG/1
2 AEROSOL RESPIRATORY (INHALATION) 2 TIMES DAILY
Qty: 10.2 G | Refills: 3 | Status: SHIPPED | OUTPATIENT
Start: 2025-07-22

## 2025-07-22 RX ORDER — HYDROXYZINE HYDROCHLORIDE 25 MG/1
TABLET, FILM COATED ORAL
Qty: 180 TABLET | Refills: 0 | Status: SHIPPED | OUTPATIENT
Start: 2025-07-22

## 2025-07-22 RX ORDER — PHENTERMINE HYDROCHLORIDE 37.5 MG/1
37.5 TABLET ORAL
Qty: 90 TABLET | Refills: 0 | Status: SHIPPED | OUTPATIENT
Start: 2025-07-22 | End: 2025-10-20

## 2025-07-22 RX ORDER — TRAZODONE HYDROCHLORIDE 100 MG/1
100 TABLET ORAL NIGHTLY
Qty: 90 TABLET | Refills: 3 | Status: SHIPPED | OUTPATIENT
Start: 2025-07-22

## 2025-07-22 RX ORDER — GABAPENTIN 300 MG/1
300 CAPSULE ORAL 3 TIMES DAILY
Qty: 270 CAPSULE | Refills: 3 | Status: SHIPPED | OUTPATIENT
Start: 2025-07-22

## 2025-07-22 ASSESSMENT — PATIENT HEALTH QUESTIONNAIRE - PHQ9
1. LITTLE INTEREST OR PLEASURE IN DOING THINGS: NOT AT ALL
SUM OF ALL RESPONSES TO PHQ9 QUESTIONS 1 AND 2: 0
2. FEELING DOWN, DEPRESSED OR HOPELESS: NOT AT ALL

## 2025-07-22 NOTE — PROGRESS NOTES
ANNUAL CPE VISIT  Chief Complaint   Patient presents with    Annual Exam     Was on Zepbound but insurance changed and no longer covered  Patient would like RX for Benzoyl peroxide and tretinoin gel  Referral to PT may need XRAYS     HPI: Reviewed chart/medical care received since last seen by me.    Weaned off Zepbound in January, stopped in March  Feels well now  Was having AE from it  Gained 15lbs back    C/O chronic neck and shoulder pain for years  IS worse now  Has done PT and chiro  Stopped In March  Not helping  S/p PAUL which didn't help  MRI cervical spine 2023: Mild spinal canal stenosis at C5-C6 with minimal flattening of the   left ventral spinal cord without spinal cord signal abnormality and   with associated mild left neural foraminal stenosis.     Assessment and Plan:  Problem List Items Addressed This Visit          High    Routine health maintenance - Primary    Overview   COVID-19 vaccine (PFIZER-BIONTECH)4/22/2021, 3/26/2021, 1/15/22  Influenza 9/23/21, 9/26/23  TDAP 7/14/22  DTAP/IPV5/24/2004  MMR5/24/2004  Meningococcal Conjugate MCV4P Vaccine, IM3/26/2015  PAP 8/17/21 (-); 5/23/25 (-)  GC/Chlamydia 8/17/21 (-)  Hep C Ab (-) 9/11/18  HIV 9/11/18 (-)           Current Assessment & Plan   Annual Wellness exam completed   Preventive Health History reviewed  Ordered:   Labs    Need HPV vaccine dates           Relevant Medications    drospirenone, contraceptive, 4 mg (28) tablet    Other Relevant Orders    Comprehensive Metabolic Panel    CBC and Auto Differential    Lipid Panel    Urinalysis with Reflex Microscopic       Medium    Acne vulgaris    Overview   Started on Slynd (drospirenone only OCP)         Relevant Medications    tretinoin (Retin-A) 0.05 % cream    benzoyl peroxide 5 % gel    ADD (attention deficit disorder)    Overview   Started on Vyvanse 9/2021, stopped 3/22  Not medicated presently  Montior  Vyvanse made her irritable         Relevant Medications    gabapentin (Neurontin) 300  mg capsule    traZODone (Desyrel) 100 mg tablet    venlafaxine XR (Effexor-XR) 150 mg 24 hr capsule    phentermine (Adipex-P) 37.5 mg tablet    Anxiety and depression    Overview   Failed SSRI (Prozac ineffective and adverse effects at high doses), Cymbalta/Celexa/Zoloft ineffective  Previously on Wellbutrin, stopped in 11/21  Klonopin stopped in 9/2021  On Effexor, dose increased 4/22  s/p inpatient admission 3/22 for passive SI, follwed by admission to IOP at ED clinic  Managed well with Effexor  [facility]  See[facility]         Relevant Medications    gabapentin (Neurontin) 300 mg capsule    hydrOXYzine HCL (Atarax) 25 mg tablet    lamoTRIgine (LaMICtal) 100 mg tablet    traZODone (Desyrel) 100 mg tablet    venlafaxine XR (Effexor-XR) 150 mg 24 hr capsule    phentermine (Adipex-P) 37.5 mg tablet    Other Relevant Orders    TSH with reflex to Free T4 if abnormal    Asthma in adult, moderate persistent, uncomplicated ([facility])    Overview   On Symbicort  prn albuterol         Relevant Medications    albuterol 90 mcg/actuation inhaler    Symbicort 160-4.5 mcg/actuation inhaler    Other Relevant Orders    Referral to Allergy    Bipolar 2 disorder (Multi)    Overview   Lamictal & Effexor  Also on Gabapentin for neck pain         Relevant Medications    gabapentin (Neurontin) 300 mg capsule    hydrOXYzine HCL (Atarax) 25 mg tablet    lamoTRIgine (LaMICtal) 100 mg tablet    traZODone (Desyrel) 100 mg tablet    venlafaxine XR (Effexor-XR) 150 mg 24 hr capsule    phentermine (Adipex-P) 37.5 mg tablet    Cervical disc disease    Overview   MRI 6/23: C5-C6: There is a focal left paracentral disc protrusion with  resultant mild spinal canal stenosis and with flattening of the left  ventral spinal cord without spinal cord signal abnormality and mild  narrowing of the left neural foramen. Tiny right paracentral  protrusion results in mild right neural foraminal stenosis.  S/P Spine consult  S/p C7-T1 ILESI  On  Gabapentin prn         Relevant Medications    cyclobenzaprine (Flexeril) 10 mg tablet    gabapentin (Neurontin) 300 mg capsule    Other Relevant Orders    MR cervical spine wo IV contrast    Referral to Physical Therapy    XR cervical spine complete 4-5 views    Cervical spinal stenosis    Overview   6/23: MRI Cervical spine: Mild spinal canal stenosis at C5-C6 with minimal flattening of the  left ventral spinal cord without spinal cord signal abnormality and  with associated mild left neural foraminal stenosis  S/P Spine consult  S/p C7-T1 ILESI         Relevant Orders    MR cervical spine wo IV contrast    Referral to Physical Therapy    XR cervical spine complete 4-5 views    Chronic insomnia    Overview   Using Trazodone, hydroxyzine  Used to use Klonopin prn (stopped 9/21)         Relevant Medications    hydrOXYzine HCL (Atarax) 25 mg tablet    traZODone (Desyrel) 100 mg tablet    Class 2 severe obesity due to excess calories with serious comorbidity and body mass index (BMI) of 38.0 to 38.9 in adult    Overview   No Family hx MEN/MTC  NO hx pancreatitis  Start weight 251lbs  Continue to focus on lifestyle modifications as discussed.  Stopped Zepbound March 2025 (AE diarrhea)         Current Assessment & Plan   Statr Adipex  If ineffective and can get coverage could try Wegovy         Relevant Medications    phentermine (Adipex-P) 37.5 mg tablet    Gastroesophageal reflux disease without esophagitis    Overview   Prn H2B         Relevant Medications    cyclobenzaprine (Flexeril) 10 mg tablet    Medication management    Overview   UDS, signed controlled substance contract, and OARRS check all up to date           Relevant Orders    DRUG SCREEN,URINE    Basic metabolic panel    Vitamin D deficiency    Relevant Orders    Vitamin D 25-Hydroxy,Total (for eval of Vitamin D levels)       ROS otherwise negative aside from what was mentioned above in HPI.    Vitals  BP 92/64   Pulse 83   Temp 37 °C (98.6 °F)   Ht  "1.562 m (5' 1.5\")   Wt 94 kg (207 lb 3.2 oz)   SpO2 97%   BMI 38.52 kg/m²   Body mass index is 38.52 kg/m².  Physical Exam  Gen: Alert, NAD  HEENT:  Normal exam  Neck:  No masses/nodes palpable; Thyroid nontender and without nodules; No ADOLFO, decraesed ROM laterally  Respiratory:  Lungs CTAB  CV: RRR  Neuro:  Gross motor and sensory intact  Skin:  No suspicious lesions present  Breast: No masses or axillary lymphadenopathy      Allergies and Medications  Apple, Chickpea, Dog dander, Pine nut, Sesame seed, and Tree nuts  Current Outpatient Medications   Medication Instructions    albuterol 90 mcg/actuation inhaler 2-4 puffs, inhalation, Every 6 hours PRN    benzoyl peroxide 5 % gel Topical, 2 times daily, apply to affected area    cholecalciferol (VITAMIN D-3) 50 mcg, Daily    cyclobenzaprine (FLEXERIL) 10 mg, oral, 3 times daily PRN    drospirenone, contraceptive, 4 mg (28) tablet 1 tablet, oral, Daily    EPINEPHrine 0.3 mg/0.3 mL injection syringe 0.3 mL, As needed    famotidine (PEPCID) 20 mg, As needed    gabapentin (Neurontin) 300 mg capsule Take 1 capsule (300 mg) by mouth 3 times a day, as needed    hydrOXYzine HCL (Atarax) 25 mg tablet Take 1-2 tablets by mouth at bedtime as needed.    lamoTRIgine (LAMICTAL) 100 mg, oral, Daily    phentermine (ADIPEX-P) 37.5 mg, oral, Daily before breakfast    Symbicort 160-4.5 mcg/actuation inhaler 2 puffs, inhalation, 2 times daily    traZODone (DESYREL) 100 mg, oral, Nightly    tretinoin (Retin-A) 0.05 % cream Topical, Nightly, Use Good Rx    venlafaxine XR (EFFEXOR-XR) 150 mg, oral, Daily, WITH FOOD @ 9AM WITH 75 MG CAP FOR DAILY DOSE 225 MG       Active Problem List  Problem List[1]      Comprehensive Medical/Surgical/Social/Family History  Medical History[2]  Surgical History[3]  Social History     Social History Narrative    Social History    Single, no kids    Has boyfriend    Works at [employer], cheer     Nonsmoker    No ETOH    ---    Family History:    " M:Mental Health Issues (Bryanna Mares)    F: Mental Health Issues, ETOHism    S: Lynn-Weidemann Syndrome, Hemihypertrophy    PUncle; DM    MGM: Lymphoma, CAD    PGM: Lymphoma, Lung Issues ()       Controlled Substance Visit:  I have personally reviewed the OARRS report and have considered the risks of abuse, dependence, addiction and diversion and I believe that it is clinically appropriate for the patient to be prescribed this medication.    Is the patient prescribed a combination of a benzodiazepine and opioid? no     The last Urine Drug Screen has been reviewed and results are as expected,  and/or the UDS was ordered today if due.  Ordered    Controlled Substance Agreement Date 25    I have reviewed each line item on the Controlled Substance Agreement including, but not limited to, the benefits, risks, and alternatives to treatment with a Controlled Substance medication(s). The patient has verbalized understanding and signed the agreement.    Anorexiants:   What is the patient's goal of therapy? Weight loss  Is this being achieved with current treatment? TBD    I have assessed the patient's continuing efforts to lose weight., I have assessed the patient's dedication to the treatment program and the response to treatment., and I have assessed the presence or absence of contraindications, adverse effects, and indicators of possible substance abuse that would necessitate cessation of treatment utilizing controlled substance.    Patient has demonstrated continued efforts to lose weight, is dedicated to the treatment program and the response to treatment. and I have assessed for the presence or absence of contraindications, adverse effects, and indicators of possible substance abuse that would necessitate cessation of treatment utilizing controlled substance.    Activities of Daily Living:   Is your overall impression that this patient is benefiting (symptom reduction outweighs side effects) from  anorexiants therapy? Yes     1. Physical Functioning: Better  2. Family Relationship: Same  3. Social Relationship: Same  4. Mood: Same  5. Sleep Patterns: Same  6. Overall Function: Better            [1]   Patient Active Problem List  Diagnosis    ADD (attention deficit disorder)    Anxiety and depression    Asthma in adult, moderate persistent, uncomplicated ([facility])    Chronic insomnia    Obsessive compulsive personality disorder (Multi)    TMJ hypermobility    Vitamin D deficiency    Colitis    H/O eating disorder    History of posttraumatic stress disorder (PTSD)    Routine health maintenance    Moderate obstructive sleep apnea    Acne vulgaris    H/O CT scan of brain    Medication management    Class 2 severe obesity due to excess calories with serious comorbidity and body mass index (BMI) of 38.0 to 38.9 in adult    H/O concussion    Cervical spinal stenosis    Cervical disc disease    Bipolar 2 disorder (Multi)    Gastroesophageal reflux disease without esophagitis   [2]   Past Medical History:  Diagnosis Date    H/O CT scan of abdomen 2022 11/19/22 - Findings suggestive of epiploic appendagitis along the mid descending colon. Borderline prominence of the appendix, without surrounding fat stranding. Clinical correlation needed. Redemonstration of mildly asymmetrically enlarged left adnexa. Consider correlation with pelvic ultrasound if clinically indicated.    H/O CT scan of brain 2014 2014: NORMAL BRAIN.  NO EVIDENCE OF AN ACUTE INTRACRANIAL PROCESS.    H/O magnetic resonance imaging of cervical spine 06/16/2023    Mild spinal canal stenosis at C5-C6 with minimal flattening of the left ventral spinal cord without spinal cord signal abnormality and with associated mild left neural foraminal stenosis    H/O pulmonary function tests     History of cervical spine x-ray 11/2022 2014: mild straightening of the cervical spine which is likely  positional 11/22: normal    History of magnetic resonance  imaging of head 07/2022 7/22:  MRI TMJ: 1. Negative study without evidence of disease involving bilateral temporomandibular joints.   [3]   Past Surgical History:  Procedure Laterality Date    INSERTION OF CONTRACEPTIVE CAPSULE  08/22/2021    Nexplanon insertion: 8/17/21, removed 6/25    NASAL SEPTUM SURGERY  09/23/2021    RHINOPLASTY  09/23/2021

## 2025-07-22 NOTE — ASSESSMENT & PLAN NOTE
Annual Wellness exam completed   Preventive Health History reviewed  Ordered:   Labs    Need HPV vaccine dates

## 2025-07-23 LAB
25(OH)D3+25(OH)D2 SERPL-MCNC: 33 NG/ML (ref 30–100)
ALBUMIN SERPL-MCNC: 4.4 G/DL (ref 3.6–5.1)
ALP SERPL-CCNC: 75 U/L (ref 31–125)
ALT SERPL-CCNC: 17 U/L (ref 6–29)
AMPHETAMINES UR QL: NEGATIVE NG/ML
ANION GAP SERPL CALCULATED.4IONS-SCNC: 10 MMOL/L (CALC) (ref 7–17)
APPEARANCE UR: ABNORMAL
AST SERPL-CCNC: 13 U/L (ref 10–30)
BACTERIA #/AREA URNS HPF: ABNORMAL /HPF
BARBITURATES UR QL: NEGATIVE NG/ML
BASOPHILS # BLD AUTO: 48 CELLS/UL (ref 0–200)
BASOPHILS NFR BLD AUTO: 0.6 %
BENZODIAZ UR QL: NEGATIVE NG/ML
BILIRUB SERPL-MCNC: 0.5 MG/DL (ref 0.2–1.2)
BILIRUB UR QL STRIP: NEGATIVE
BUN SERPL-MCNC: 10 MG/DL (ref 7–25)
BZE UR QL: NEGATIVE NG/ML
CALCIUM SERPL-MCNC: 9.4 MG/DL (ref 8.6–10.2)
CHLORIDE SERPL-SCNC: 104 MMOL/L (ref 98–110)
CHOLEST SERPL-MCNC: 164 MG/DL
CHOLEST/HDLC SERPL: 2.5 (CALC)
CO2 SERPL-SCNC: 24 MMOL/L (ref 20–32)
COLOR UR: YELLOW
CREAT SERPL-MCNC: 0.91 MG/DL (ref 0.5–0.96)
CREAT UR-MCNC: 178.3 MG/DL
EGFRCR SERPLBLD CKD-EPI 2021: 89 ML/MIN/1.73M2
EOSINOPHIL # BLD AUTO: 872 CELLS/UL (ref 15–500)
EOSINOPHIL NFR BLD AUTO: 10.9 %
ERYTHROCYTE [DISTWIDTH] IN BLOOD BY AUTOMATED COUNT: 12.4 % (ref 11–15)
FENTANYL UR QL SCN: NEGATIVE NG/ML
GLUCOSE SERPL-MCNC: 89 MG/DL (ref 65–139)
GLUCOSE UR QL STRIP: NEGATIVE
HCT VFR BLD AUTO: 45.6 % (ref 35–45)
HDLC SERPL-MCNC: 65 MG/DL
HGB BLD-MCNC: 14.9 G/DL (ref 11.7–15.5)
HGB UR QL STRIP: NEGATIVE
HYALINE CASTS #/AREA URNS LPF: ABNORMAL /LPF
KETONES UR QL STRIP: NEGATIVE
LDLC SERPL CALC-MCNC: 84 MG/DL (CALC)
LEUKOCYTE ESTERASE UR QL STRIP: ABNORMAL
LYMPHOCYTES # BLD AUTO: 2568 CELLS/UL (ref 850–3900)
LYMPHOCYTES NFR BLD AUTO: 32.1 %
MCH RBC QN AUTO: 29.2 PG (ref 27–33)
MCHC RBC AUTO-ENTMCNC: 32.7 G/DL (ref 32–36)
MCV RBC AUTO: 89.4 FL (ref 80–100)
METHADONE UR QL: NEGATIVE NG/ML
MONOCYTES # BLD AUTO: 648 CELLS/UL (ref 200–950)
MONOCYTES NFR BLD AUTO: 8.1 %
NEUTROPHILS # BLD AUTO: 3864 CELLS/UL (ref 1500–7800)
NEUTROPHILS NFR BLD AUTO: 48.3 %
NITRITE UR QL STRIP: NEGATIVE
NONHDLC SERPL-MCNC: 99 MG/DL (CALC)
OPIATES UR QL: NEGATIVE NG/ML
OXIDANTS UR QL: NEGATIVE MCG/ML
OXYCODONE UR QL: NEGATIVE NG/ML
PH UR STRIP: 6 [PH] (ref 5–8)
PH UR: 6.7 [PH] (ref 4.5–9)
PLATELET # BLD AUTO: 283 THOUSAND/UL (ref 140–400)
PMV BLD REES-ECKER: 10.4 FL (ref 7.5–12.5)
POTASSIUM SERPL-SCNC: 4.1 MMOL/L (ref 3.5–5.3)
PROT SERPL-MCNC: 7.1 G/DL (ref 6.1–8.1)
PROT UR QL STRIP: NEGATIVE
QUEST NOTES AND COMMENTS: NORMAL
RBC # BLD AUTO: 5.1 MILLION/UL (ref 3.8–5.1)
RBC #/AREA URNS HPF: ABNORMAL /HPF
SERVICE CMNT-IMP: ABNORMAL
SODIUM SERPL-SCNC: 138 MMOL/L (ref 135–146)
SP GR UR STRIP: 1.02 (ref 1–1.03)
SQUAMOUS #/AREA URNS HPF: ABNORMAL /HPF
THC UR QL: NEGATIVE NG/ML
TRIGL SERPL-MCNC: 69 MG/DL
TSH SERPL-ACNC: 3.52 MIU/L
WBC # BLD AUTO: 8 THOUSAND/UL (ref 3.8–10.8)
WBC #/AREA URNS HPF: ABNORMAL /HPF

## 2025-08-07 ENCOUNTER — APPOINTMENT (OUTPATIENT)
Dept: RADIOLOGY | Facility: CLINIC | Age: 25
End: 2025-08-07
Payer: COMMERCIAL

## 2025-08-11 ENCOUNTER — PATIENT MESSAGE (OUTPATIENT)
Dept: PRIMARY CARE | Facility: CLINIC | Age: 25
End: 2025-08-11
Payer: COMMERCIAL

## 2025-08-15 ENCOUNTER — APPOINTMENT (OUTPATIENT)
Dept: RADIOLOGY | Facility: HOSPITAL | Age: 25
End: 2025-08-15
Payer: COMMERCIAL

## 2025-08-23 ENCOUNTER — APPOINTMENT (OUTPATIENT)
Dept: RADIOLOGY | Facility: HOSPITAL | Age: 25
End: 2025-08-23
Payer: COMMERCIAL

## 2025-08-28 ENCOUNTER — APPOINTMENT (OUTPATIENT)
Dept: ORTHOPEDIC SURGERY | Facility: CLINIC | Age: 25
End: 2025-08-28
Payer: COMMERCIAL

## 2025-10-20 ENCOUNTER — APPOINTMENT (OUTPATIENT)
Dept: PRIMARY CARE | Facility: CLINIC | Age: 25
End: 2025-10-20
Payer: COMMERCIAL

## 2026-08-07 ENCOUNTER — APPOINTMENT (OUTPATIENT)
Dept: PRIMARY CARE | Facility: CLINIC | Age: 26
End: 2026-08-07
Payer: COMMERCIAL